# Patient Record
Sex: FEMALE | Race: OTHER | URBAN - METROPOLITAN AREA
[De-identification: names, ages, dates, MRNs, and addresses within clinical notes are randomized per-mention and may not be internally consistent; named-entity substitution may affect disease eponyms.]

---
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
## 2018-01-30 ENCOUNTER — APPOINTMENT (OUTPATIENT)
Dept: GENERAL RADIOLOGY | Facility: HOSPITAL | Age: 18
End: 2018-01-30
Attending: PHYSICIAN ASSISTANT

## 2018-01-30 ENCOUNTER — HOSPITAL ENCOUNTER (EMERGENCY)
Facility: HOSPITAL | Age: 18
Discharge: HOME | End: 2018-01-30

## 2018-01-30 VITALS
WEIGHT: 156 LBS | TEMPERATURE: 98 F | OXYGEN SATURATION: 100 % | SYSTOLIC BLOOD PRESSURE: 101 MMHG | DIASTOLIC BLOOD PRESSURE: 61 MMHG | HEIGHT: 63 IN | HEART RATE: 62 BPM | BODY MASS INDEX: 27.64 KG/M2 | RESPIRATION RATE: 16 BRPM

## 2018-01-30 DIAGNOSIS — R10.9 ABDOMINAL PAIN, ACUTE: Primary | ICD-10-CM

## 2018-01-30 LAB
ALBUMIN SERPL BCP-MCNC: 4.6 G/DL (ref 3.5–4.8)
ALP SERPL-CCNC: 53 U/L (ref 39–325)
ALT SERPL-CCNC: 17 U/L (ref 14–54)
ANION GAP SERPL CALC-SCNC: 9 MMOL/L (ref 0–18)
AST SERPL-CCNC: 18 U/L (ref 15–41)
B-HCG UR QL: NEGATIVE
BASOPHILS # BLD: 0 K/UL (ref 0–0.2)
BASOPHILS NFR BLD: 0 %
BILIRUB DIRECT SERPL-MCNC: 0.1 MG/DL (ref 0–0.2)
BILIRUB SERPL-MCNC: 0.7 MG/DL (ref 0.3–1.2)
BILIRUB UR QL: NEGATIVE
BUN SERPL-MCNC: 12 MG/DL (ref 8–20)
BUN/CREAT SERPL: 17.1 (ref 10–20)
CALCIUM SERPL-MCNC: 9.5 MG/DL (ref 8.5–10.5)
CHLORIDE SERPL-SCNC: 106 MMOL/L (ref 95–110)
CLARITY UR: CLEAR
CO2 SERPL-SCNC: 24 MMOL/L (ref 22–32)
COLOR UR: YELLOW
CREAT SERPL-MCNC: 0.7 MG/DL (ref 0.5–1.5)
EOSINOPHIL # BLD: 0.2 K/UL (ref 0–0.7)
EOSINOPHIL NFR BLD: 2 %
ERYTHROCYTE [DISTWIDTH] IN BLOOD BY AUTOMATED COUNT: 12.8 % (ref 11–15)
GLUCOSE SERPL-MCNC: 80 MG/DL (ref 70–99)
GLUCOSE UR-MCNC: NEGATIVE MG/DL
HCT VFR BLD AUTO: 44.8 % (ref 35–48)
HGB BLD-MCNC: 15.2 G/DL (ref 12–16)
HGB UR QL STRIP.AUTO: NEGATIVE
KETONES UR-MCNC: NEGATIVE MG/DL
LYMPHOCYTES # BLD: 2.2 K/UL (ref 1–4)
LYMPHOCYTES NFR BLD: 30 %
MCH RBC QN AUTO: 31.2 PG (ref 27–32)
MCHC RBC AUTO-ENTMCNC: 34 G/DL (ref 32–37)
MCV RBC AUTO: 91.7 FL (ref 80–100)
MONOCYTES # BLD: 0.3 K/UL (ref 0–1)
MONOCYTES NFR BLD: 4 %
NEUTROPHILS # BLD AUTO: 4.6 K/UL (ref 1.8–7.7)
NEUTROPHILS NFR BLD: 64 %
NITRITE UR QL STRIP.AUTO: NEGATIVE
OSMOLALITY UR CALC.SUM OF ELEC: 287 MOSM/KG (ref 275–295)
PH UR: 6 [PH] (ref 5–8)
PLATELET # BLD AUTO: 216 K/UL (ref 140–400)
PMV BLD AUTO: 8 FL (ref 7.4–10.3)
POTASSIUM SERPL-SCNC: 4 MMOL/L (ref 3.3–5.1)
PROT SERPL-MCNC: 6.7 G/DL (ref 5.9–8.4)
PROT UR-MCNC: NEGATIVE MG/DL
RBC # BLD AUTO: 4.89 M/UL (ref 3.7–5.4)
RBC #/AREA URNS AUTO: 1 /HPF
SODIUM SERPL-SCNC: 139 MMOL/L (ref 136–144)
SP GR UR STRIP: 1.02 (ref 1–1.03)
UROBILINOGEN UR STRIP-ACNC: <2
VIT C UR-MCNC: NEGATIVE MG/DL
WBC # BLD AUTO: 7.3 K/UL (ref 4–11)
WBC #/AREA URNS AUTO: 1 /HPF

## 2018-01-30 PROCEDURE — 36415 COLL VENOUS BLD VENIPUNCTURE: CPT

## 2018-01-30 PROCEDURE — 81025 URINE PREGNANCY TEST: CPT

## 2018-01-30 PROCEDURE — 81001 URINALYSIS AUTO W/SCOPE: CPT

## 2018-01-30 PROCEDURE — 99283 EMERGENCY DEPT VISIT LOW MDM: CPT

## 2018-01-30 PROCEDURE — 85025 COMPLETE CBC W/AUTO DIFF WBC: CPT | Performed by: PHYSICIAN ASSISTANT

## 2018-01-30 PROCEDURE — 80048 BASIC METABOLIC PNL TOTAL CA: CPT | Performed by: PHYSICIAN ASSISTANT

## 2018-01-30 PROCEDURE — 80076 HEPATIC FUNCTION PANEL: CPT | Performed by: PHYSICIAN ASSISTANT

## 2018-01-30 PROCEDURE — 74018 RADEX ABDOMEN 1 VIEW: CPT | Performed by: PHYSICIAN ASSISTANT

## 2018-01-30 RX ORDER — DICYCLOMINE HCL 20 MG
20 TABLET ORAL ONCE
Status: COMPLETED | OUTPATIENT
Start: 2018-01-30 | End: 2018-01-30

## 2018-01-30 RX ORDER — DICYCLOMINE HCL 20 MG
20 TABLET ORAL
Qty: 16 TABLET | Refills: 0 | Status: SHIPPED | OUTPATIENT
Start: 2018-01-30 | End: 2018-02-03

## 2018-01-30 NOTE — ED PROVIDER NOTES
Patient Seen in: [facility] AND [facility] Emergency Department    History   Patient presents with:  Abdomen/Flank Pain (GI/)    Stated Complaint: Abdominal & Back Pain    HPI  21 yof with onset of lower abd cramping this morning around 2 am. She denies chest Mouth/Throat: No oropharyngeal exudate, posterior oropharyngeal edema or posterior oropharyngeal erythema. Eyes: EOM are normal. Pupils are equal, round, and reactive to light. Neck: Normal range of motion. Neck supple.    Cardiovascular: Normal rate Creatinine 0.70 0.50 - 1.50 mg/dL   Calcium, Total 9.5 8.5 - 10.5 mg/dL   BUN/CREA Ratio 17.1 10.0 - 20.0   Anion Gap 9 0 - 18 mmol/L   Calculated Osmolality 287 275 - 295 mOsm/kg   GFR, Non-African American >60 >=60   GFR, -American >60 >=60   -H a visit in 2 days          Medications Prescribed:  Current Discharge Medication List    START taking these medications    Dicyclomine HCl 20 MG Oral Tab  Take 1 tablet (20 mg total) by mouth 4 (four) times daily before meals and nightly.   Qty: 16 tablet R

## 2018-01-30 NOTE — ED NOTES
Pt c/o lower abd pain which radiates to lower back. Also reports urinary urgency and pressure sensation during urination.

## 2018-08-07 ENCOUNTER — HOSPITAL (OUTPATIENT)
Dept: OTHER | Age: 18
End: 2018-08-07
Attending: EMERGENCY MEDICINE

## 2018-08-07 LAB
ALBUMIN SERPL-MCNC: 4.2 GM/DL (ref 3.6–5.1)
ALBUMIN/GLOB SERPL: 1.6 {RATIO} (ref 1–2.4)
ALP SERPL-CCNC: 44 UNIT/L (ref 45–117)
ALT SERPL-CCNC: 21 UNIT/L
AMORPH SED URNS QL MICRO: ABNORMAL
ANALYZER ANC (IANC): NORMAL
ANION GAP SERPL CALC-SCNC: 13 MMOL/L (ref 10–20)
APPEARANCE UR: CLEAR
AST SERPL-CCNC: 15 UNIT/L
BACTERIA #/AREA URNS HPF: ABNORMAL /HPF
BASOPHILS # BLD: 0 THOUSAND/MCL (ref 0–0.3)
BASOPHILS NFR BLD: 1 %
BILIRUB SERPL-MCNC: 0.5 MG/DL (ref 0.2–1)
BILIRUB UR QL: NEGATIVE
BUN SERPL-MCNC: 8 MG/DL (ref 6–20)
BUN/CREAT SERPL: 12 (ref 7–25)
CALCIUM SERPL-MCNC: 9.2 MG/DL (ref 8.4–10.2)
CAOX CRY URNS QL MICRO: ABNORMAL
CHLORIDE: 106 MMOL/L (ref 98–107)
CO2 SERPL-SCNC: 25 MMOL/L (ref 21–32)
COLOR UR: COLORLESS
CREAT SERPL-MCNC: 0.65 MG/DL (ref 0.51–0.95)
DIFFERENTIAL METHOD BLD: NORMAL
EOSINOPHIL # BLD: 0.3 THOUSAND/MCL (ref 0.1–0.5)
EOSINOPHIL NFR BLD: 4 %
EPITH CASTS #/AREA URNS LPF: ABNORMAL /[LPF]
ERYTHROCYTE [DISTWIDTH] IN BLOOD: 12.6 % (ref 11–15)
FATTY CASTS #/AREA URNS LPF: ABNORMAL /[LPF]
GLOBULIN SER-MCNC: 2.7 GM/DL (ref 2–4)
GLUCOSE SERPL-MCNC: 87 MG/DL (ref 65–99)
GLUCOSE UR-MCNC: NEGATIVE MG/DL
GRAN CASTS #/AREA URNS LPF: ABNORMAL /[LPF]
HEMATOCRIT: 40.7 % (ref 36–46.5)
HGB BLD-MCNC: 14.4 GM/DL (ref 12–15.5)
HGB UR QL: ABNORMAL
HYALINE CASTS #/AREA URNS LPF: ABNORMAL /LPF (ref 0–5)
KETONES UR-MCNC: NEGATIVE MG/DL
LEUKOCYTE ESTERASE UR QL STRIP: NEGATIVE
LIPASE SERPL-CCNC: 174 UNIT/L (ref 73–393)
LYMPHOCYTES # BLD: 2.9 THOUSAND/MCL (ref 1.2–5.2)
LYMPHOCYTES NFR BLD: 34 %
MAGNESIUM SERPL-MCNC: 2 MG/DL (ref 1.7–2.4)
MCH RBC QN AUTO: 31.3 PG (ref 26–34)
MCHC RBC AUTO-ENTMCNC: 35.4 GM/DL (ref 32–36.5)
MCV RBC AUTO: 88.5 FL (ref 78–100)
MIXED CELL CASTS #/AREA URNS LPF: ABNORMAL /[LPF]
MONOCYTES # BLD: 0.5 THOUSAND/MCL (ref 0.3–0.9)
MONOCYTES NFR BLD: 5 %
MUCOUS THREADS URNS QL MICRO: ABNORMAL
NEUTROPHILS # BLD: 4.9 THOUSAND/MCL (ref 1.8–8)
NEUTROPHILS NFR BLD: 56 %
NEUTS SEG NFR BLD: NORMAL %
NITRITE UR QL: NEGATIVE
NRBC (NRBCRE): NORMAL
PH UR: 7 UNIT (ref 5–7)
PLATELET # BLD: 215 THOUSAND/MCL (ref 140–450)
POTASSIUM SERPL-SCNC: 3.9 MMOL/L (ref 3.4–5.1)
PROT SERPL-MCNC: 6.9 GM/DL (ref 6.4–8.2)
PROT UR QL: NEGATIVE MG/DL
RBC # BLD: 4.6 MILLION/MCL (ref 4–5.2)
RBC #/AREA URNS HPF: ABNORMAL /HPF (ref 0–3)
RBC CASTS #/AREA URNS LPF: ABNORMAL /[LPF]
RENAL EPI CELLS #/AREA URNS HPF: ABNORMAL /[HPF]
SODIUM SERPL-SCNC: 140 MMOL/L (ref 135–145)
SP GR UR: <1.005 (ref 1–1.03)
SPECIMEN SOURCE: ABNORMAL
SPERM URNS QL MICRO: ABNORMAL
SQUAMOUS #/AREA URNS HPF: ABNORMAL /HPF (ref 0–5)
T VAGINALIS URNS QL MICRO: ABNORMAL
TRI-PHOS CRY URNS QL MICRO: ABNORMAL
URATE CRY URNS QL MICRO: ABNORMAL
URINE REFLEX: ABNORMAL
URNS CMNT MICRO: ABNORMAL
UROBILINOGEN UR QL: 0.2 MG/DL (ref 0–1)
WAXY CASTS #/AREA URNS LPF: ABNORMAL /[LPF]
WBC # BLD: 8.6 THOUSAND/MCL (ref 4.2–11)
WBC #/AREA URNS HPF: ABNORMAL /HPF (ref 0–5)
WBC CASTS #/AREA URNS LPF: ABNORMAL /[LPF]
YEAST HYPHAE URNS QL MICRO: ABNORMAL
YEAST URNS QL MICRO: ABNORMAL

## 2018-12-11 ENCOUNTER — HOSPITAL ENCOUNTER (OUTPATIENT)
Age: 18
Discharge: HOME | End: 2018-12-11
Payer: COMMERCIAL

## 2018-12-11 VITALS
HEART RATE: 98 BPM | TEMPERATURE: 99 F | BODY MASS INDEX: 27.29 KG/M2 | HEIGHT: 63 IN | DIASTOLIC BLOOD PRESSURE: 75 MMHG | WEIGHT: 154 LBS | SYSTOLIC BLOOD PRESSURE: 116 MMHG | RESPIRATION RATE: 18 BRPM | OXYGEN SATURATION: 99 %

## 2018-12-11 DIAGNOSIS — N89.8 VAGINAL DISCHARGE: Primary | ICD-10-CM

## 2018-12-11 PROCEDURE — 87205 SMEAR GRAM STAIN: CPT | Performed by: NURSE PRACTITIONER

## 2018-12-11 PROCEDURE — 87491 CHLMYD TRACH DNA AMP PROBE: CPT | Performed by: NURSE PRACTITIONER

## 2018-12-11 PROCEDURE — 87106 FUNGI IDENTIFICATION YEAST: CPT | Performed by: NURSE PRACTITIONER

## 2018-12-11 PROCEDURE — 81003 URINALYSIS AUTO W/O SCOPE: CPT

## 2018-12-11 PROCEDURE — 87591 N.GONORRHOEAE DNA AMP PROB: CPT | Performed by: NURSE PRACTITIONER

## 2018-12-11 PROCEDURE — 99214 OFFICE O/P EST MOD 30 MIN: CPT

## 2018-12-11 PROCEDURE — 99204 OFFICE O/P NEW MOD 45 MIN: CPT

## 2018-12-11 PROCEDURE — 87808 TRICHOMONAS ASSAY W/OPTIC: CPT | Performed by: NURSE PRACTITIONER

## 2018-12-11 RX ORDER — METRONIDAZOLE 500 MG/1
500 TABLET ORAL 2 TIMES DAILY
Qty: 14 TABLET | Refills: 0 | Status: SHIPPED | OUTPATIENT
Start: 2018-12-11 | End: 2018-12-18

## 2018-12-11 NOTE — ED INITIAL ASSESSMENT (HPI)
Pt complaining of vaginal discharge, odor, itching for one week. Pt denies any pain with urination  Pt states her period started today.

## 2018-12-11 NOTE — ED PROVIDER NOTES
Patient presents with:  Eval-G (gynecologic)      HPI:     Gagan Rachel is a 19year old female with a history of bipolar and PTSD present presents with vaginal discharge, odor and vaginal itching over the course the last week.   Denies any dysuria, urinary with the patient patient would like to be treated for BV at this time. Will place patient on Flagyl, she has tolerated this medication in the past.  Discussed with her that we will call her in 2 days with results.   Any worsening symptoms she should go to

## 2019-01-28 ENCOUNTER — OFFICE VISIT (OUTPATIENT)
Dept: INTERNAL MEDICINE CLINIC | Facility: CLINIC | Age: 19
End: 2019-01-28
Payer: COMMERCIAL

## 2019-01-28 VITALS
WEIGHT: 148.81 LBS | OXYGEN SATURATION: 98 % | DIASTOLIC BLOOD PRESSURE: 70 MMHG | HEART RATE: 92 BPM | SYSTOLIC BLOOD PRESSURE: 116 MMHG | HEIGHT: 62 IN | BODY MASS INDEX: 27.38 KG/M2

## 2019-01-28 DIAGNOSIS — K59.00 CONSTIPATION, UNSPECIFIED CONSTIPATION TYPE: Primary | ICD-10-CM

## 2019-01-28 DIAGNOSIS — R19.4 CHANGE IN STOOL HABITS: ICD-10-CM

## 2019-01-28 DIAGNOSIS — K21.9 GASTROESOPHAGEAL REFLUX DISEASE, ESOPHAGITIS PRESENCE NOT SPECIFIED: ICD-10-CM

## 2019-01-28 DIAGNOSIS — R10.9 ABDOMINAL PAIN, UNSPECIFIED ABDOMINAL LOCATION: ICD-10-CM

## 2019-01-28 DIAGNOSIS — F31.9 BIPOLAR AFFECTIVE DISORDER, REMISSION STATUS UNSPECIFIED (HCC): ICD-10-CM

## 2019-01-28 PROBLEM — Z72.0 TOBACCO USE: Status: ACTIVE | Noted: 2019-01-28

## 2019-01-28 LAB
ALBUMIN SERPL BCP-MCNC: 4.4 G/DL (ref 3.5–4.8)
ALBUMIN/GLOB SERPL: 2.4 {RATIO} (ref 1–2)
ALP SERPL-CCNC: 40 U/L (ref 39–325)
ALT SERPL-CCNC: 17 U/L (ref 14–54)
ANION GAP SERPL CALC-SCNC: 8 MMOL/L (ref 0–18)
AST SERPL-CCNC: 21 U/L (ref 15–41)
BASOPHILS # BLD: 0 K/UL (ref 0–0.2)
BASOPHILS NFR BLD: 0 %
BILIRUB SERPL-MCNC: 0.6 MG/DL (ref 0.3–1.2)
BUN SERPL-MCNC: 9 MG/DL (ref 8–20)
BUN/CREAT SERPL: 12 (ref 10–20)
CALCIUM SERPL-MCNC: 9.2 MG/DL (ref 8.5–10.5)
CHLORIDE SERPL-SCNC: 107 MMOL/L (ref 95–110)
CO2 SERPL-SCNC: 20 MMOL/L (ref 22–32)
CREAT SERPL-MCNC: 0.75 MG/DL (ref 0.5–1.5)
EOSINOPHIL # BLD: 0.2 K/UL (ref 0–0.7)
EOSINOPHIL NFR BLD: 4 %
ERYTHROCYTE [DISTWIDTH] IN BLOOD BY AUTOMATED COUNT: 12.9 % (ref 11–15)
GLOBULIN PLAS-MCNC: 1.8 G/DL (ref 2.5–3.7)
GLUCOSE SERPL-MCNC: 90 MG/DL (ref 70–99)
HCT VFR BLD AUTO: 42.4 % (ref 35–48)
HGB BLD-MCNC: 14.1 G/DL (ref 12–16)
LYMPHOCYTES # BLD: 1.9 K/UL (ref 1–4)
LYMPHOCYTES NFR BLD: 32 %
MCH RBC QN AUTO: 30.2 PG (ref 27–32)
MCHC RBC AUTO-ENTMCNC: 33.2 G/DL (ref 32–37)
MCV RBC AUTO: 90.8 FL (ref 80–100)
MONOCYTES # BLD: 0.3 K/UL (ref 0–1)
MONOCYTES NFR BLD: 6 %
NEUTROPHILS # BLD AUTO: 3.6 K/UL (ref 1.8–7.7)
NEUTROPHILS NFR BLD: 59 %
OSMOLALITY UR CALC.SUM OF ELEC: 278 MOSM/KG (ref 275–295)
PATIENT FASTING: NO
PLATELET # BLD AUTO: 177 K/UL (ref 140–400)
PMV BLD AUTO: 8.5 FL (ref 7.4–10.3)
POTASSIUM SERPL-SCNC: 4.1 MMOL/L (ref 3.3–5.1)
PROT SERPL-MCNC: 6.2 G/DL (ref 5.9–8.4)
RBC # BLD AUTO: 4.67 M/UL (ref 3.7–5.4)
SODIUM SERPL-SCNC: 135 MMOL/L (ref 136–144)
TSH SERPL-ACNC: 0.7 UIU/ML (ref 0.45–5.33)
WBC # BLD AUTO: 6.1 K/UL (ref 4–11)

## 2019-01-28 PROCEDURE — 80050 GENERAL HEALTH PANEL: CPT | Performed by: FAMILY MEDICINE

## 2019-01-28 PROCEDURE — 99214 OFFICE O/P EST MOD 30 MIN: CPT | Performed by: FAMILY MEDICINE

## 2019-01-28 RX ORDER — POLYETHYLENE GLYCOL 3350 17 G/17G
17 POWDER, FOR SOLUTION ORAL DAILY
Qty: 30 EACH | Refills: 2 | Status: SHIPPED | OUTPATIENT
Start: 2019-01-28 | End: 2019-03-25

## 2019-01-28 RX ORDER — OMEPRAZOLE 20 MG/1
20 TABLET, DELAYED RELEASE ORAL
Qty: 30 TABLET | Refills: 1 | Status: SHIPPED | OUTPATIENT
Start: 2019-01-28 | End: 2019-10-01

## 2019-01-28 NOTE — PATIENT INSTRUCTIONS
Metamucil daily    Docusate daily    miralax ( polyethylene glycol) at night if needed for constipation    Lots of water    Work on stress    Follow up 2 mos for physical    Stop smoking      Diet and Lifestyle Tips for Irritable Bowel Syndrome (IBS)    Livia Brown foods, such as citrus and tomato-based foods. These can increase symptoms. · Limit drinking alcohol, caffeine, and fizzy beverages. All increase acid reflux.   · Try limiting chocolate, peppermint, and spearmint. These can worsen acid reflux in some people

## 2019-01-28 NOTE — PROGRESS NOTES
Jeronimo Fabry is a 19year old female. CC:  Patient presents with: Other: Patient complains of stomach problems. Having constipation and diarrhea.       HPI:      On and off constipated x 1 year  Problems is mostly constipation  Does drink a lot of jacek shortness of breath  GI:  No N/V/D, no pain  PSYCH:  No depression, no anxiety. SKIN:  No rashes, no lesions  EXTREMITIES:  No swelling, full ROM. :  No urinary or genital complaints.   MKSKL:  No ROM restrictions, no weakness, no pain  NEURO:  Denies h related to constipation  See #1  - TSH W REFLEX TO FREE T4; Future  - COMP METABOLIC PANEL (14); Future  - CBC WITH DIFFERENTIAL WITH PLATELET;  Future  - TSH W REFLEX TO FREE T4  - COMP METABOLIC PANEL (14)  - CBC WITH DIFFERENTIAL WITH PLATELET  - CBC W/

## 2019-02-01 ENCOUNTER — TELEPHONE (OUTPATIENT)
Dept: INTERNAL MEDICINE CLINIC | Facility: CLINIC | Age: 19
End: 2019-02-01

## 2019-03-25 ENCOUNTER — OFFICE VISIT (OUTPATIENT)
Dept: INTERNAL MEDICINE CLINIC | Facility: CLINIC | Age: 19
End: 2019-03-25
Payer: COMMERCIAL

## 2019-03-25 VITALS
SYSTOLIC BLOOD PRESSURE: 110 MMHG | HEART RATE: 84 BPM | HEIGHT: 62 IN | DIASTOLIC BLOOD PRESSURE: 68 MMHG | WEIGHT: 153.81 LBS | BODY MASS INDEX: 28.31 KG/M2 | OXYGEN SATURATION: 99 %

## 2019-03-25 DIAGNOSIS — F60.3 BORDERLINE PERSONALITY DISORDER (HCC): ICD-10-CM

## 2019-03-25 DIAGNOSIS — D49.2 SKIN GROWTH: ICD-10-CM

## 2019-03-25 DIAGNOSIS — Z00.00 PHYSICAL EXAM: Primary | ICD-10-CM

## 2019-03-25 DIAGNOSIS — Z72.0 TOBACCO USE: ICD-10-CM

## 2019-03-25 PROCEDURE — 90472 IMMUNIZATION ADMIN EACH ADD: CPT | Performed by: FAMILY MEDICINE

## 2019-03-25 PROCEDURE — 90734 MENACWYD/MENACWYCRM VACC IM: CPT | Performed by: FAMILY MEDICINE

## 2019-03-25 PROCEDURE — 99395 PREV VISIT EST AGE 18-39: CPT | Performed by: FAMILY MEDICINE

## 2019-03-25 PROCEDURE — 90651 9VHPV VACCINE 2/3 DOSE IM: CPT | Performed by: FAMILY MEDICINE

## 2019-03-25 PROCEDURE — 90471 IMMUNIZATION ADMIN: CPT | Performed by: FAMILY MEDICINE

## 2019-03-25 NOTE — PROGRESS NOTES
HPI:   Melissa Juarez is a 19year old female who presents for a complete physical exam.    Menses:  Regular  Around q 30 days, last 4 days    Contraception:  Condoms only, too many mood SE on pills   Supplements:  Probiotics   Exercise:  Going to start at problems  LUNGS: denies shortness of breath  CARDIOVASCULAR: denies chest pain  GI: denies abdominal pain,  No constipation or diarrhea  : denies dysuria, vaginal discharge or itching  NEURO: denies headaches  PSYCHE: denies depression or anxiety    EXAM visit.      4. Skin growth    - DERM - INTERNAL      Meds & Refills for this Visit:  Requested Prescriptions      No prescriptions requested or ordered in this encounter       Imaging & Consults:  None

## 2019-04-24 ENCOUNTER — OFFICE VISIT (OUTPATIENT)
Dept: INTERNAL MEDICINE CLINIC | Facility: CLINIC | Age: 19
End: 2019-04-24
Payer: COMMERCIAL

## 2019-04-24 VITALS
BODY MASS INDEX: 27.79 KG/M2 | HEIGHT: 62 IN | OXYGEN SATURATION: 99 % | WEIGHT: 151 LBS | HEART RATE: 84 BPM | SYSTOLIC BLOOD PRESSURE: 116 MMHG | DIASTOLIC BLOOD PRESSURE: 70 MMHG

## 2019-04-24 DIAGNOSIS — Z72.0 TOBACCO USE: ICD-10-CM

## 2019-04-24 DIAGNOSIS — K59.00 CONSTIPATION, UNSPECIFIED CONSTIPATION TYPE: Primary | ICD-10-CM

## 2019-04-24 DIAGNOSIS — F60.3 BORDERLINE PERSONALITY DISORDER (HCC): ICD-10-CM

## 2019-04-24 DIAGNOSIS — K21.9 GASTROESOPHAGEAL REFLUX DISEASE, ESOPHAGITIS PRESENCE NOT SPECIFIED: ICD-10-CM

## 2019-04-24 PROCEDURE — 99214 OFFICE O/P EST MOD 30 MIN: CPT | Performed by: FAMILY MEDICINE

## 2019-04-24 RX ORDER — OMEPRAZOLE 20 MG/1
20 TABLET, DELAYED RELEASE ORAL
Qty: 30 TABLET | Refills: 1 | Status: SHIPPED | OUTPATIENT
Start: 2019-04-24 | End: 2020-03-12 | Stop reason: ALTCHOICE

## 2019-04-24 NOTE — PROGRESS NOTES
CC:  Abdominal Pain (patient presents to clinic for IBS F/U) and HPV (2nd dose HPV)      Hx of CC:    F/u IBS-     Constipated better, not needing miralax much   More nauseated lately  Takes PPI but prn  Not taking it past few days  Bad acid for 3 days las movement   NEURO: no gross deficits   Assessment and Plan:      1. Constipation, unspecified constipation type  Improved     2. Gastroesophageal reflux disease, esophagitis presence not specified  Discussed dietary and lifestyle modification.  Avoid trigger

## 2019-10-01 ENCOUNTER — OFFICE VISIT (OUTPATIENT)
Dept: FAMILY MEDICINE CLINIC | Facility: CLINIC | Age: 19
End: 2019-10-01
Payer: COMMERCIAL

## 2019-10-01 VITALS
RESPIRATION RATE: 13 BRPM | BODY MASS INDEX: 27.42 KG/M2 | DIASTOLIC BLOOD PRESSURE: 80 MMHG | OXYGEN SATURATION: 98 % | SYSTOLIC BLOOD PRESSURE: 128 MMHG | WEIGHT: 149 LBS | HEIGHT: 62 IN | HEART RATE: 84 BPM

## 2019-10-01 DIAGNOSIS — Z72.0 TOBACCO USE: ICD-10-CM

## 2019-10-01 DIAGNOSIS — Z23 NEED FOR VACCINATION: ICD-10-CM

## 2019-10-01 DIAGNOSIS — Z00.00 HEALTHCARE MAINTENANCE: Primary | ICD-10-CM

## 2019-10-01 DIAGNOSIS — Z11.3 SCREEN FOR STD (SEXUALLY TRANSMITTED DISEASE): ICD-10-CM

## 2019-10-01 DIAGNOSIS — R59.0 LEFT CERVICAL LYMPHADENOPATHY: ICD-10-CM

## 2019-10-01 PROCEDURE — 90686 IIV4 VACC NO PRSV 0.5 ML IM: CPT | Performed by: FAMILY MEDICINE

## 2019-10-01 PROCEDURE — 81025 URINE PREGNANCY TEST: CPT | Performed by: FAMILY MEDICINE

## 2019-10-01 PROCEDURE — 87591 N.GONORRHOEAE DNA AMP PROB: CPT | Performed by: FAMILY MEDICINE

## 2019-10-01 PROCEDURE — 87389 HIV-1 AG W/HIV-1&-2 AB AG IA: CPT | Performed by: FAMILY MEDICINE

## 2019-10-01 PROCEDURE — 80061 LIPID PANEL: CPT | Performed by: FAMILY MEDICINE

## 2019-10-01 PROCEDURE — 86780 TREPONEMA PALLIDUM: CPT | Performed by: FAMILY MEDICINE

## 2019-10-01 PROCEDURE — 90472 IMMUNIZATION ADMIN EACH ADD: CPT | Performed by: FAMILY MEDICINE

## 2019-10-01 PROCEDURE — 99213 OFFICE O/P EST LOW 20 MIN: CPT | Performed by: FAMILY MEDICINE

## 2019-10-01 PROCEDURE — 87491 CHLMYD TRACH DNA AMP PROBE: CPT | Performed by: FAMILY MEDICINE

## 2019-10-01 PROCEDURE — 80050 GENERAL HEALTH PANEL: CPT | Performed by: FAMILY MEDICINE

## 2019-10-01 PROCEDURE — 90732 PPSV23 VACC 2 YRS+ SUBQ/IM: CPT | Performed by: FAMILY MEDICINE

## 2019-10-01 PROCEDURE — 36415 COLL VENOUS BLD VENIPUNCTURE: CPT | Performed by: FAMILY MEDICINE

## 2019-10-01 PROCEDURE — 90471 IMMUNIZATION ADMIN: CPT | Performed by: FAMILY MEDICINE

## 2019-10-01 NOTE — PROGRESS NOTES
FLULAVAL 0.5ML ADM TO RD IM , [id number] 0.5ML ADM TO LD IM, VIS given to patient, patient tolerated vaccines well

## 2019-10-01 NOTE — PROGRESS NOTES
HPI:   Patient presents with:  Physical: annual physical      Yary Sousa is a 22year old female presenting for:  Already had physical on 3/2019. Requesting routine bloodwork and STD testing    Concerned about enlarged lymph nodes in neck b/l.  Had re LDL Cholesterol 79 <100 mg/dL    VLDL 21 0 - 30 mg/dL    Non HDL Chol 100 <130 mg/dL    FASTING Yes    TSH W REFLEX TO FREE T4   Result Value Ref Range    TSH 1.480 0.358 - 3.740 mIU/mL   T PALLIDUM SCREENING CASCADE   Result Value Ref Range    Treponemal 10/21/2012      Smokeless tobacco: Never Used    Alcohol use: No      Alcohol/week: 0.0 standard drinks    Drug use: No     Family History:  No family history on file.        REVIEW OF SYSTEMS:   Review of Systems   Constitutional: Negative for fatigue and COMP METABOLIC PANEL (14); Future  -     LIPID PANEL; Future  -     TSH W REFLEX TO FREE T4; Future  -     URINE PREGNANCY TEST    Screen for STD (sexually transmitted disease)  -     HIV AG AB COMBO; Future  -     CHLAMYDIA/GONOCOCCUS, VIRIDIANA;  Future  - 07/08/2019  Influenza Vaccine(1) due on 09/01/2019  Annual Depression Screen due on 03/25/2020  Annual Physical due on 03/25/2020  Tobacco Cessation Counseling 2 Years due on 10/01/2021  DTaP,Tdap,and Td Vaccines(7 - Td) due on 08/21/2022  Hepatitis B Vacc

## 2019-10-09 ENCOUNTER — TELEPHONE (OUTPATIENT)
Dept: FAMILY MEDICINE CLINIC | Facility: CLINIC | Age: 19
End: 2019-10-09

## 2019-10-09 NOTE — TELEPHONE ENCOUNTER
----- Message from Aquiles Núñez MD sent at 10/8/2019 12:49 AM CDT -----  Please let her know all bloodwork and STD testing normal

## 2020-03-12 ENCOUNTER — OFFICE VISIT (OUTPATIENT)
Dept: FAMILY MEDICINE CLINIC | Facility: CLINIC | Age: 20
End: 2020-03-12
Payer: COMMERCIAL

## 2020-03-12 VITALS
OXYGEN SATURATION: 98 % | HEART RATE: 83 BPM | SYSTOLIC BLOOD PRESSURE: 98 MMHG | HEIGHT: 63.5 IN | BODY MASS INDEX: 26.08 KG/M2 | WEIGHT: 149 LBS | DIASTOLIC BLOOD PRESSURE: 70 MMHG

## 2020-03-12 DIAGNOSIS — R59.0 LYMPHADENOPATHY, CERVICAL: Primary | ICD-10-CM

## 2020-03-12 DIAGNOSIS — R53.83 FATIGUE, UNSPECIFIED TYPE: ICD-10-CM

## 2020-03-12 LAB
BASOPHILS # BLD AUTO: 0.05 X10(3) UL (ref 0–0.2)
BASOPHILS NFR BLD AUTO: 0.7 %
DEPRECATED RDW RBC AUTO: 42.4 FL (ref 35.1–46.3)
EOSINOPHIL # BLD AUTO: 0.3 X10(3) UL (ref 0–0.7)
EOSINOPHIL NFR BLD AUTO: 4 %
ERYTHROCYTE [DISTWIDTH] IN BLOOD BY AUTOMATED COUNT: 12.4 % (ref 11–15)
HCT VFR BLD AUTO: 41.3 % (ref 35–48)
HGB BLD-MCNC: 14 G/DL (ref 12–16)
IMM GRANULOCYTES # BLD AUTO: 0.02 X10(3) UL (ref 0–1)
IMM GRANULOCYTES NFR BLD: 0.3 %
LYMPHOCYTES # BLD AUTO: 2.57 X10(3) UL (ref 1–4)
LYMPHOCYTES NFR BLD AUTO: 34.3 %
MCH RBC QN AUTO: 31.3 PG (ref 26–34)
MCHC RBC AUTO-ENTMCNC: 33.9 G/DL (ref 31–37)
MCV RBC AUTO: 92.4 FL (ref 80–100)
MONOCYTES # BLD AUTO: 0.32 X10(3) UL (ref 0.1–1)
MONOCYTES NFR BLD AUTO: 4.3 %
NEUTROPHILS # BLD AUTO: 4.23 X10 (3) UL (ref 1.5–7.7)
NEUTROPHILS # BLD AUTO: 4.23 X10(3) UL (ref 1.5–7.7)
NEUTROPHILS NFR BLD AUTO: 56.4 %
PLATELET # BLD AUTO: 200 10(3)UL (ref 150–450)
RBC # BLD AUTO: 4.47 X10(6)UL (ref 3.8–5.3)
T3FREE SERPL-MCNC: 2.73 PG/ML (ref 2.4–4.2)
T4 FREE SERPL-MCNC: 1 NG/DL (ref 0.8–1.7)
TSI SER-ACNC: 0.32 MIU/ML (ref 0.36–3.74)
WBC # BLD AUTO: 7.5 X10(3) UL (ref 4–11)

## 2020-03-12 PROCEDURE — 99214 OFFICE O/P EST MOD 30 MIN: CPT | Performed by: FAMILY MEDICINE

## 2020-03-12 PROCEDURE — 85025 COMPLETE CBC W/AUTO DIFF WBC: CPT | Performed by: FAMILY MEDICINE

## 2020-03-12 PROCEDURE — 84481 FREE ASSAY (FT-3): CPT | Performed by: FAMILY MEDICINE

## 2020-03-12 PROCEDURE — 36415 COLL VENOUS BLD VENIPUNCTURE: CPT | Performed by: FAMILY MEDICINE

## 2020-03-12 PROCEDURE — 84443 ASSAY THYROID STIM HORMONE: CPT | Performed by: FAMILY MEDICINE

## 2020-03-12 PROCEDURE — 84439 ASSAY OF FREE THYROXINE: CPT | Performed by: FAMILY MEDICINE

## 2020-03-12 NOTE — PROGRESS NOTES
CHIEF COMPLAINT: Patient presents with:  Throat Problem: lymph nodule        HPI:     Melissa Juarez is a 22year old female presents for neck lump and fatigue. Carine Zafar is a 23 yo F p/w concern for swelling of her lymph nodes.   This has been occurring sin the the HPI. PHYSICAL EXAM:   BP 98/70 (BP Location: Right arm, Patient Position: Sitting, Cuff Size: adult)   Pulse 83   Ht 63.5\"   Wt 149 lb (67.6 kg)   LMP 02/27/2020 (Approximate)   SpO2 98%   BMI 25.98 kg/m²   Vital signs reviewed. Appears stated a • ALT 10/01/2019 24    • AST 10/01/2019 17    • Alkaline Phosphatase 10/01/2019 47*   • Bilirubin, Total 10/01/2019 0.6    • Total Protein 10/01/2019 6.9    • Albumin 10/01/2019 4.2    • Globulin  10/01/2019 2.7*   • A/G Ratio 10/01/2019 1.6    • FASTING Completed      Patient/Caregiver Education: There are no barriers to learning. Medical education done. Outcome: Patient verbalizes understanding and agrees with plan. Advised to call or RTC if symptoms persist or worsen.     Problem List:   Patient Active

## 2020-03-12 NOTE — PATIENT INSTRUCTIONS
Lymphadenopathy  Lymphadenopathy is swelling of the lymph nodes. Lymph nodes are small, bean-shaped glands around the body. What are lymph nodes? Lymph nodes are part of your immune system.  These glands are found in your neck, over your clavicle, armpi You may also have symptoms from an infection causing the swollen glands. These symptoms may include fever, sore throat, body aches, or cough. Diagnosing lymphadenopathy  Your healthcare provider will ask about your health history and symptoms.  He or she w © 3650-9377 [address]. [address] [facility], [facility] [place]. All rights reserved. This information is not intended as a substitute for professional medical care. Always follow your healthcare professional's instructions.

## 2020-04-22 ENCOUNTER — TELEPHONE (OUTPATIENT)
Dept: FAMILY MEDICINE CLINIC | Facility: CLINIC | Age: 20
End: 2020-04-22

## 2020-08-04 NOTE — BH LEVEL OF CARE ASSESSMENT
Level of Care Assessment Note    General Questions  Why are you here?: \"I said I was suicidal. I have borderline personality disorder, it was not serious. I said it out of impulse. \"  Precipitating Events: Patient was pulled over for not completely stoppi feel like she has been coasting lately. She doesn't have the same drive that she did. She used to think it could be better.  Her step-father, sister and I will be moving to another state in January and she has been living with a family friend in a trailer o attempts. Family History or Personal Lived Experience of Loss or Near Loss by Suicide: Yes  Describe loss(es): Friends Abhijit Hathaway ( when patient was 14), Fransisco ( 4 years ago) and Keith Brown ( 2 years ago).      Danger to Others/Property  Have you h been sleeping well and hasn't been eating lately. Anxiety Symptoms: Other (Comment); Generalized;Panic attack  Panic Attacks: Patient reports having anxiety and panic attack hx but said she can deal with it.  Patient did become overwhelmed earlier tonight Seclusion/Restraint: Yes    Alcohol Use  How often do you have a drink containing alcohol? : Currently abstinent but used to drink  Alcohol Use  Age at first use?: 21years old   Route: Oral  Average amount used? : A half a bottle (of 1/5th) and was consum Issues:  Other (comment)(Patient denies issues with her job)  Concerns/Conflicts with Social Relationships: No  Decreased Functional Ability: Other (comment)(Patient denies issues with completing ADL's)  Do you have any prior/current legal concerns?: Other Organized  Content: Ordinary  Level of Consciousness: Alert  Level of Consciousness: Alert  Behavior  Exhibited behavior: Participated    Assessment Summary  Assessment Summary: Patient is a 23 y.o. female who presents to the ED via [place] [employer] after she w Pre-[facility]emplative    Level of Care Recommendations  Consulted with: Dr. Perez Monday  Level of Care Recommendation: Inpatient Acute Care  Unit: Adult  Reason for Unit Assigned: Age, sxs  Inpatient Criteria: Suicidal/homicidal risk; Severely decreased function;F

## 2020-08-04 NOTE — ED NOTES
This writer explained [address], admission process and mental health rights. Patient agreed to voluntary and was provided copies for all documents signed. A copy of the paperwork was scanned into EMR and originals were placed on the chart.

## 2020-08-04 NOTE — ED NOTES
Transfer summary    Patient is refusing [facility] for placement and is requesting [address] [place] - accepted referral over the phone and packet was faxed for review.   Marino Cabral took basic information and asked for the packet to be

## 2020-08-04 NOTE — ED NOTES
Pt out of ed via [facility]. Pt in no distress speaking clear sentences.  Pt to [facility].

## 2020-08-04 NOTE — ED INITIAL ASSESSMENT (HPI)
Pt presents to ED in [employer] lockup after being arrested. Pt made SI statements in lockup. Pt has a mental health history with 9 hospitalizations. Pt uncooperative. PD at bedside. Belongings given to security.  Pt has healing cuts to bilateral thighs and wrist.

## 2020-08-04 NOTE — ED NOTES
Pt states the following \"I am suicidal because I am boarderline and my life sucks. I have $13 to my name. I have been clean for 5 days of drugs and alcohol. I take medical mariajuana. I need a cigarette!  I am only suicidal because I am borderline and don'

## 2020-08-04 NOTE — ED NOTES
Per Neda Min, [address] needs to make some arrangements for the bed and will follow up after 8 AM.

## 2020-08-04 NOTE — ED PROVIDER NOTES
Patient Seen in: [facility] AND [facility] Emergency Department      History   Patient presents with:  Gera-ALLEN    Stated Complaint: SI    HPI    24-year-old female with a history of reported bipolar disorder as well as PTSD and history of substance abuse and self kg/m²         Physical Exam    Constitutional: Oriented to person, place, and time. Appears well-developed. No distress but with pressured speech, anxious and tearful. Head: Normocephalic and atraumatic.    Eyes: Conjunctivae are normal. Pupils are equal DRAW GOLD   CBC W/ DIFFERENTIAL                  MDM     The patient will be kept on seclusion here. She will have medical clearance. She will be evaluated by the psychiatric liaison for further management decisions at that time.     Certificate completed

## 2020-08-04 NOTE — ED NOTES
Patient explained that she needed to lower her voice and stop yelling at staff. Patient upset that she is staying inpatient and states no one understands. Patient offered a phone to call mother and boyfriend as long as she stays calm.  Patient agrees to leda

## 2020-08-04 NOTE — ED PROVIDER NOTES
Patient endorsed to me by Dr. Shanna Morgan for continuation of care. Patient is awaiting inpatient psychiatric transfer and has been calm throughout my shift. Patient endorsed to Dr. Manuel No for continuation of care.

## 2020-12-20 NOTE — ED NOTES
Patient has been tentatively accepted to [facility] under Dr. Alex Sin pending RN report. Per Calista Rinne, she will be calling in about 20 minutes to get RN report. [facility] -- Sami Golden, Ngoc, Vlad, Otoniel Bliss Savella  Office # [phone]      Follow Up:  post op    Subjective/Observations:   No events overnight resting comfortably in bed.  No complaints of chest pain, dyspnea, or palpitations reported. No signs of orthopnea or PND.      REVIEW OF SYSTEMS: All other review of systems is negative unless indicated above    PAST MEDICAL & SURGICAL HISTORY:  Peripheral vascular disease  S/p Angioplasty rt lower EXT Nov 2020    Hyperlipidemia    Diabetes    HTN (hypertension)    H/O angioplasty  RLE        MEDICATIONS  (STANDING):  atorvastatin 40 milliGRAM(s) Oral at bedtime  dextrose 40% Gel 15 Gram(s) Oral once  dextrose 5%. 1000 milliLiter(s) (50 mL/Hr) IV Continuous <Continuous>  dextrose 5%. 1000 milliLiter(s) (100 mL/Hr) IV Continuous <Continuous>  dextrose 50% Injectable 25 Gram(s) IV Push once  dextrose 50% Injectable 25 Gram(s) IV Push once  dextrose 50% Injectable 12.5 Gram(s) IV Push once  enoxaparin Injectable 40 milliGRAM(s) SubCutaneous daily  ertapenem  IVPB 1000 milliGRAM(s) IV Intermittent every 24 hours  glucagon  Injectable 1 milliGRAM(s) IntraMuscular once  insulin glargine Injectable (LANTUS) 10 Unit(s) SubCutaneous at bedtime  insulin lispro (ADMELOG) corrective regimen sliding scale   SubCutaneous at bedtime  insulin lispro (ADMELOG) corrective regimen sliding scale   SubCutaneous three times a day before meals  insulin lispro Injectable (ADMELOG) 5 Unit(s) SubCutaneous three times a day before meals  lisinopril 40 milliGRAM(s) Oral daily  metoprolol succinate ER 25 milliGRAM(s) Oral daily  senna 2 Tablet(s) Oral at bedtime    MEDICATIONS  (PRN):  HYDROmorphone  Injectable 1 milliGRAM(s) IV Push every 4 hours PRN Severe Pain (7 - 10)  morphine  - Injectable 2 milliGRAM(s) IV Push every 6 hours PRN Moderate Pain (4 - 6)  polyethylene glycol 3350 17 Gram(s) Oral daily PRN Constipation  traMADol 50 milliGRAM(s) Oral every 6 hours PRN Mild Pain (1 - 3)      Allergies    No Known Allergies    Intolerances        Vital Signs Last 24 Hrs  T(C): 36.8 (20 Dec 2020 04:54), Max: 37.1 (19 Dec 2020 13:05)  T(F): 98.3 (20 Dec 2020 04:54), Max: 98.7 (19 Dec 2020 13:05)  HR: 77 (20 Dec 2020 04:54) (72 - 77)  BP: 132/74 (20 Dec 2020 04:54) (114/70 - 132/74)  BP(mean): 7 (19 Dec 2020 21:30) (7 - 7)  RR: 18 (20 Dec 2020 04:54) (18 - 18)  SpO2: 95% (20 Dec 2020 04:54) (95% - 98%)    I&O's Summary    19 Dec 2020 07:01  -  20 Dec 2020 07:00  --------------------------------------------------------  IN: 100 mL / OUT: 0 mL / NET: 100 mL          PHYSICAL EXAM:  TELE: not on tele   Constitutional: NAD, awake and alert, well-developed  HEENT: Moist Mucous Membranes, Anicteric  Pulmonary: Non-labored, breath sounds are clear bilaterally, No wheezing, crackles or rhonchi  Cardiovascular: Regular, S1 and S2 nl, + murmur   Gastrointestinal: Bowel Sounds present, soft, nontender.   Lymph: No lymphadenopathy. No peripheral edema.  Skin: foot dressing   Psych:  Mood & affect appropriate    LABS: All Labs Reviewed:                        11.9   10.31 )-----------( 233      ( 20 Dec 2020 06:28 )             36.7                         12.4   10.71 )-----------( 262      ( 19 Dec 2020 08:19 )             38.3                         13.5   10.00 )-----------( 324      ( 18 Dec 2020 07:54 )             40.6     20 Dec 2020 06:28    138    |  104    |  19     ----------------------------<  163    4.8     |  31     |  0.87   19 Dec 2020 08:19    140    |  107    |  23     ----------------------------<  159    5.0     |  29     |  0.91   18 Dec 2020 07:54    141    |  107    |  19     ----------------------------<  167    4.1     |  28     |  0.78     Ca    8.4        20 Dec 2020 06:28  Ca    8.4        19 Dec 2020 08:19  Ca    8.6        18 Dec 2020 07:54    TPro  6.8    /  Alb  3.5    /  TBili  0.3    /  DBili  x      /  AST  10     /  ALT  24     /  AlkPhos  61     18 Dec 2020 00:42             ECG:  < from: 12 Lead ECG (12.16.20 @ 09:37) >    Ventricular Rate 107 BPM    Atrial Rate 107 BPM    P-R Interval 130 ms    QRS Duration 82 ms    Q-T Interval 348 ms    QTC Calculation(Bazett) 464 ms    P Axis -2 degrees    R Axis -19 degrees    T Axis 19 degrees    Diagnosis Line Sinus tachycardia    < end of copied text >    Echo:    Radiology:

## 2022-03-08 ENCOUNTER — LAB ENCOUNTER (OUTPATIENT)
Dept: LAB | Facility: HOSPITAL | Age: 22
End: 2022-03-08
Attending: INTERNAL MEDICINE
Payer: COMMERCIAL

## 2022-03-08 ENCOUNTER — OFFICE VISIT (OUTPATIENT)
Dept: FAMILY MEDICINE CLINIC | Facility: CLINIC | Age: 22
End: 2022-03-08
Payer: COMMERCIAL

## 2022-03-08 VITALS
HEART RATE: 70 BPM | DIASTOLIC BLOOD PRESSURE: 70 MMHG | HEIGHT: 63 IN | WEIGHT: 132.38 LBS | SYSTOLIC BLOOD PRESSURE: 110 MMHG | OXYGEN SATURATION: 100 % | BODY MASS INDEX: 23.46 KG/M2

## 2022-03-08 DIAGNOSIS — Z00.00 PREVENTATIVE HEALTH CARE: ICD-10-CM

## 2022-03-08 DIAGNOSIS — E04.9 THYROID ENLARGED: ICD-10-CM

## 2022-03-08 DIAGNOSIS — R59.0 CERVICAL LYMPHADENOPATHY: Primary | ICD-10-CM

## 2022-03-08 LAB
ALBUMIN SERPL-MCNC: 4.3 G/DL (ref 3.4–5)
ALBUMIN/GLOB SERPL: 1.7 {RATIO} (ref 1–2)
ALP LIVER SERPL-CCNC: 55 U/L
ALT SERPL-CCNC: 22 U/L
ANION GAP SERPL CALC-SCNC: 5 MMOL/L (ref 0–18)
AST SERPL-CCNC: 12 U/L (ref 15–37)
BASOPHILS # BLD AUTO: 0.06 X10(3) UL (ref 0–0.2)
BASOPHILS NFR BLD AUTO: 0.8 %
BILIRUB SERPL-MCNC: 0.6 MG/DL (ref 0.1–2)
BUN BLD-MCNC: 14 MG/DL (ref 7–18)
BUN/CREAT SERPL: 23 (ref 10–20)
CALCIUM BLD-MCNC: 8.8 MG/DL (ref 8.5–10.1)
CHLORIDE SERPL-SCNC: 112 MMOL/L (ref 98–112)
CO2 SERPL-SCNC: 24 MMOL/L (ref 21–32)
CREAT BLD-MCNC: 0.61 MG/DL
DEPRECATED RDW RBC AUTO: 41.6 FL (ref 35.1–46.3)
EOSINOPHIL # BLD AUTO: 0.65 X10(3) UL (ref 0–0.7)
EOSINOPHIL NFR BLD AUTO: 8.7 %
ERYTHROCYTE [DISTWIDTH] IN BLOOD BY AUTOMATED COUNT: 11.9 % (ref 11–15)
FASTING STATUS PATIENT QL REPORTED: YES
GLOBULIN PLAS-MCNC: 2.5 G/DL (ref 2.8–4.4)
GLUCOSE BLD-MCNC: 77 MG/DL (ref 70–99)
HCT VFR BLD AUTO: 43.1 %
HGB BLD-MCNC: 14.2 G/DL
IMM GRANULOCYTES # BLD AUTO: 0.02 X10(3) UL (ref 0–1)
IMM GRANULOCYTES NFR BLD: 0.3 %
LYMPHOCYTES # BLD AUTO: 1.91 X10(3) UL (ref 1–4)
LYMPHOCYTES NFR BLD AUTO: 25.4 %
MCH RBC QN AUTO: 31.6 PG (ref 26–34)
MCHC RBC AUTO-ENTMCNC: 32.9 G/DL (ref 31–37)
MCV RBC AUTO: 95.8 FL
MONOCYTES # BLD AUTO: 0.38 X10(3) UL (ref 0.1–1)
MONOCYTES NFR BLD AUTO: 5.1 %
NEUTROPHILS # BLD AUTO: 4.49 X10 (3) UL (ref 1.5–7.7)
NEUTROPHILS # BLD AUTO: 4.49 X10(3) UL (ref 1.5–7.7)
NEUTROPHILS NFR BLD AUTO: 59.7 %
OSMOLALITY SERPL CALC.SUM OF ELEC: 291 MOSM/KG (ref 275–295)
PLATELET # BLD AUTO: 198 10(3)UL (ref 150–450)
POTASSIUM SERPL-SCNC: 4.1 MMOL/L (ref 3.5–5.1)
PROT SERPL-MCNC: 6.8 G/DL (ref 6.4–8.2)
RBC # BLD AUTO: 4.5 X10(6)UL
SODIUM SERPL-SCNC: 141 MMOL/L (ref 136–145)
T3 SERPL-MCNC: 126 NG/DL (ref 60–181)
T4 SERPL-MCNC: 10.5 UG/DL
TSI SER-ACNC: 1.49 MIU/ML (ref 0.36–3.74)
WBC # BLD AUTO: 7.5 X10(3) UL (ref 4–11)

## 2022-03-08 PROCEDURE — 3078F DIAST BP <80 MM HG: CPT | Performed by: INTERNAL MEDICINE

## 2022-03-08 PROCEDURE — 84443 ASSAY THYROID STIM HORMONE: CPT

## 2022-03-08 PROCEDURE — 3008F BODY MASS INDEX DOCD: CPT | Performed by: INTERNAL MEDICINE

## 2022-03-08 PROCEDURE — 84436 ASSAY OF TOTAL THYROXINE: CPT

## 2022-03-08 PROCEDURE — 85025 COMPLETE CBC W/AUTO DIFF WBC: CPT | Performed by: INTERNAL MEDICINE

## 2022-03-08 PROCEDURE — 80053 COMPREHEN METABOLIC PANEL: CPT

## 2022-03-08 PROCEDURE — 84480 ASSAY TRIIODOTHYRONINE (T3): CPT

## 2022-03-08 PROCEDURE — 99214 OFFICE O/P EST MOD 30 MIN: CPT | Performed by: INTERNAL MEDICINE

## 2022-03-08 PROCEDURE — 3074F SYST BP LT 130 MM HG: CPT | Performed by: INTERNAL MEDICINE

## 2022-03-08 PROCEDURE — 36415 COLL VENOUS BLD VENIPUNCTURE: CPT | Performed by: INTERNAL MEDICINE

## 2022-03-18 ENCOUNTER — HOSPITAL ENCOUNTER (OUTPATIENT)
Dept: ULTRASOUND IMAGING | Facility: HOSPITAL | Age: 22
Discharge: HOME | End: 2022-03-18
Attending: INTERNAL MEDICINE
Payer: COMMERCIAL

## 2022-03-18 DIAGNOSIS — E04.9 THYROID ENLARGED: ICD-10-CM

## 2022-03-18 DIAGNOSIS — R59.0 CERVICAL LYMPHADENOPATHY: ICD-10-CM

## 2022-03-18 PROCEDURE — 76536 US EXAM OF HEAD AND NECK: CPT | Performed by: INTERNAL MEDICINE

## 2022-03-21 ENCOUNTER — TELEPHONE (OUTPATIENT)
Dept: FAMILY MEDICINE CLINIC | Facility: CLINIC | Age: 22
End: 2022-03-21

## 2022-03-21 RX ORDER — NAPROXEN 500 MG/1
500 TABLET ORAL 2 TIMES DAILY WITH MEALS
Qty: 30 TABLET | Refills: 0 | Status: SHIPPED | OUTPATIENT
Start: 2022-03-21

## 2022-03-21 NOTE — TELEPHONE ENCOUNTER
Spoke to patient. She does not have access to [facility] and thus unable to view results. She was informed of lab results as noted below. This RN verbally requested from Dr. Vinny Donohue to review US. Per patient, she doesn't have a hard time breathing - states that her \"throat feels really tight at the top. \" Losing voice. No trouble swallowing. She also feels like there's \"something hanging under her chin\" and is hardened. Not sure if she overextends her neck or what could be causing this issue. She was informed that she will be contacted with US results and plans moving forward.

## 2022-03-21 NOTE — TELEPHONE ENCOUNTER
Spoke to patient. Informed of US results as noted below. Will take medication for a week. Appt scheduled for 3/29 at 11:20 with Dr. Dominic Childers for f/up.

## 2022-03-21 NOTE — TELEPHONE ENCOUNTER
----- Message from Idalia Fowler MD sent at 3/21/2022  2:45 PM CDT -----  US did not show any evidence of infection of mass. Possible thyroiditis noted. Have her start naproxen 500 mg BID for 7 days. Take with food. See me in 1-2 weeks for symptoms evaluation.

## 2022-03-21 NOTE — TELEPHONE ENCOUNTER
Pt is calling stating that neck is swollen, discomfort.hard time breathing. Pt was notified to go to emergency. She also wanted to know test results.         Please call and advise

## 2022-04-20 ENCOUNTER — TELEPHONE (OUTPATIENT)
Dept: INTERNAL MEDICINE CLINIC | Facility: CLINIC | Age: 22
End: 2022-04-20

## 2022-07-27 ENCOUNTER — APPOINTMENT (OUTPATIENT)
Dept: GENERAL RADIOLOGY | Age: 22
End: 2022-07-27
Attending: EMERGENCY MEDICINE

## 2022-07-27 ENCOUNTER — HOSPITAL ENCOUNTER (EMERGENCY)
Age: 22
Discharge: LEFT BEFORE DISCHARGE | End: 2022-07-27

## 2022-07-27 VITALS
HEART RATE: 65 BPM | OXYGEN SATURATION: 98 % | WEIGHT: 126 LBS | TEMPERATURE: 97.7 F | SYSTOLIC BLOOD PRESSURE: 102 MMHG | RESPIRATION RATE: 18 BRPM | DIASTOLIC BLOOD PRESSURE: 68 MMHG

## 2022-07-27 LAB
ALBUMIN SERPL-MCNC: 4 G/DL (ref 3.6–5.1)
ALBUMIN/GLOB SERPL: 1.5 {RATIO} (ref 1–2.4)
ALP SERPL-CCNC: 48 UNITS/L (ref 45–117)
ALT SERPL-CCNC: 14 UNITS/L
ANION GAP SERPL CALC-SCNC: 10 MMOL/L (ref 7–19)
AST SERPL-CCNC: 8 UNITS/L
BASOPHILS # BLD: 0 K/MCL (ref 0–0.3)
BASOPHILS NFR BLD: 1 %
BILIRUB SERPL-MCNC: 0.4 MG/DL (ref 0.2–1)
BUN SERPL-MCNC: 13 MG/DL (ref 6–20)
BUN/CREAT SERPL: 21 (ref 7–25)
CALCIUM SERPL-MCNC: 9 MG/DL (ref 8.4–10.2)
CHLORIDE SERPL-SCNC: 111 MMOL/L (ref 97–110)
CO2 SERPL-SCNC: 24 MMOL/L (ref 21–32)
CREAT SERPL-MCNC: 0.62 MG/DL (ref 0.51–0.95)
DEPRECATED RDW RBC: 42.4 FL (ref 39–50)
EOSINOPHIL # BLD: 0.2 K/MCL (ref 0–0.5)
EOSINOPHIL NFR BLD: 4 %
ERYTHROCYTE [DISTWIDTH] IN BLOOD: 12.4 % (ref 11–15)
FASTING DURATION TIME PATIENT: ABNORMAL H
GFR SERPLBLD BASED ON 1.73 SQ M-ARVRAT: >90 ML/MIN
GLOBULIN SER-MCNC: 2.6 G/DL (ref 2–4)
GLUCOSE SERPL-MCNC: 89 MG/DL (ref 70–99)
HCT VFR BLD CALC: 39.7 % (ref 36–46.5)
HGB BLD-MCNC: 13.5 G/DL (ref 12–15.5)
IMM GRANULOCYTES # BLD AUTO: 0 K/MCL (ref 0–0.2)
IMM GRANULOCYTES # BLD: 0 %
LYMPHOCYTES # BLD: 1.3 K/MCL (ref 1–4.8)
LYMPHOCYTES NFR BLD: 25 %
MCH RBC QN AUTO: 31.8 PG (ref 26–34)
MCHC RBC AUTO-ENTMCNC: 34 G/DL (ref 32–36.5)
MCV RBC AUTO: 93.4 FL (ref 78–100)
MONOCYTES # BLD: 0.3 K/MCL (ref 0.3–0.9)
MONOCYTES NFR BLD: 6 %
NEUTROPHILS # BLD: 3.4 K/MCL (ref 1.8–7.7)
NEUTROPHILS NFR BLD: 64 %
NRBC BLD MANUAL-RTO: 0 /100 WBC
PLATELET # BLD AUTO: 174 K/MCL (ref 140–450)
POTASSIUM SERPL-SCNC: 4 MMOL/L (ref 3.4–5.1)
PROT SERPL-MCNC: 6.6 G/DL (ref 6.4–8.2)
RAINBOW EXTRA TUBES HOLD SPECIMEN: NORMAL
RBC # BLD: 4.25 MIL/MCL (ref 4–5.2)
SODIUM SERPL-SCNC: 141 MMOL/L (ref 135–145)
TROPONIN I SERPL DL<=0.01 NG/ML-MCNC: <4 NG/L
WBC # BLD: 5.3 K/MCL (ref 4.2–11)

## 2022-07-27 PROCEDURE — 10003627 HB COUNTER ED NO SERVICE

## 2022-07-27 PROCEDURE — 85025 COMPLETE CBC W/AUTO DIFF WBC: CPT | Performed by: EMERGENCY MEDICINE

## 2022-07-27 PROCEDURE — 93005 ELECTROCARDIOGRAM TRACING: CPT | Performed by: EMERGENCY MEDICINE

## 2022-07-27 PROCEDURE — 84484 ASSAY OF TROPONIN QUANT: CPT | Performed by: EMERGENCY MEDICINE

## 2022-07-27 PROCEDURE — 36415 COLL VENOUS BLD VENIPUNCTURE: CPT

## 2022-07-27 PROCEDURE — 80053 COMPREHEN METABOLIC PANEL: CPT | Performed by: EMERGENCY MEDICINE

## 2022-07-27 PROCEDURE — 71045 X-RAY EXAM CHEST 1 VIEW: CPT

## 2022-07-27 ASSESSMENT — PAIN SCALES - GENERAL: PAINLEVEL_OUTOF10: 5

## 2022-07-28 LAB
ATRIAL RATE (BPM): 60
P AXIS (DEGREES): 55
PR-INTERVAL (MSEC): 228
QRS-INTERVAL (MSEC): 84
QT-INTERVAL (MSEC): 408
QTC: 408
R AXIS (DEGREES): 72
REPORT TEXT: NORMAL
T AXIS (DEGREES): 60
VENTRICULAR RATE EKG/MIN (BPM): 60

## 2023-01-25 ENCOUNTER — TELEPHONE (OUTPATIENT)
Dept: INTERNAL MEDICINE CLINIC | Facility: CLINIC | Age: 23
End: 2023-01-25

## 2023-01-25 NOTE — TELEPHONE ENCOUNTER
Pt is calling asking for note to go back to work on January 31. Pt states she has to turn in before January 27.        Please call and advise

## 2023-01-25 NOTE — TELEPHONE ENCOUNTER
Patient needs appt to be release to work, per Dr Pam Maldonado make her an in person appt. No excuse to be given for the 6 months but can possibly get the note to return to work with no restrictions. Patient was notified and took an appt to be seen Monday.

## 2023-01-25 NOTE — TELEPHONE ENCOUNTER
Patient was dx anorexia and has been off for 6 months, her psychiatrist is asking PCP to authorize the return to work note.

## 2024-03-15 ENCOUNTER — HOSPITAL ENCOUNTER (EMERGENCY)
Facility: HOSPITAL | Age: 24
Discharge: HOME | End: 2024-03-15
Attending: STUDENT IN AN ORGANIZED HEALTH CARE EDUCATION/TRAINING PROGRAM
Payer: COMMERCIAL

## 2024-03-15 ENCOUNTER — APPOINTMENT (OUTPATIENT)
Dept: ULTRASOUND IMAGING | Facility: HOSPITAL | Age: 24
End: 2024-03-15
Attending: STUDENT IN AN ORGANIZED HEALTH CARE EDUCATION/TRAINING PROGRAM
Payer: COMMERCIAL

## 2024-03-15 VITALS
WEIGHT: 154 LBS | HEIGHT: 63 IN | OXYGEN SATURATION: 98 % | DIASTOLIC BLOOD PRESSURE: 65 MMHG | SYSTOLIC BLOOD PRESSURE: 104 MMHG | HEART RATE: 71 BPM | TEMPERATURE: 98 F | RESPIRATION RATE: 16 BRPM | BODY MASS INDEX: 27.29 KG/M2

## 2024-03-15 DIAGNOSIS — R53.83 OTHER FATIGUE: ICD-10-CM

## 2024-03-15 DIAGNOSIS — K29.00 ACUTE GASTRITIS WITHOUT HEMORRHAGE, UNSPECIFIED GASTRITIS TYPE: Primary | ICD-10-CM

## 2024-03-15 LAB
ALBUMIN SERPL-MCNC: 4.6 G/DL (ref 3.2–4.8)
ALBUMIN/GLOB SERPL: 2.4 {RATIO} (ref 1–2)
ALP LIVER SERPL-CCNC: 64 U/L
ALT SERPL-CCNC: 27 U/L
ANION GAP SERPL CALC-SCNC: 5 MMOL/L (ref 0–18)
AST SERPL-CCNC: 18 U/L (ref ?–34)
B-HCG UR QL: NEGATIVE
BASOPHILS # BLD AUTO: 0.03 X10(3) UL (ref 0–0.2)
BASOPHILS NFR BLD AUTO: 0.5 %
BILIRUB SERPL-MCNC: 0.2 MG/DL (ref 0.3–1.2)
BILIRUB UR QL: NEGATIVE
BUN BLD-MCNC: 13 MG/DL (ref 9–23)
BUN/CREAT SERPL: 16.5 (ref 10–20)
CALCIUM BLD-MCNC: 9.5 MG/DL (ref 8.7–10.4)
CHLORIDE SERPL-SCNC: 111 MMOL/L (ref 98–112)
CO2 SERPL-SCNC: 26 MMOL/L (ref 21–32)
COLOR UR: YELLOW
CREAT BLD-MCNC: 0.79 MG/DL
DEPRECATED RDW RBC AUTO: 39.9 FL (ref 35.1–46.3)
EGFRCR SERPLBLD CKD-EPI 2021: 106 ML/MIN/1.73M2 (ref 60–?)
EOSINOPHIL # BLD AUTO: 0.11 X10(3) UL (ref 0–0.7)
EOSINOPHIL NFR BLD AUTO: 2 %
ERYTHROCYTE [DISTWIDTH] IN BLOOD BY AUTOMATED COUNT: 12.2 % (ref 11–15)
GLOBULIN PLAS-MCNC: 1.9 G/DL (ref 2.8–4.4)
GLUCOSE BLD-MCNC: 84 MG/DL (ref 70–99)
GLUCOSE UR-MCNC: NORMAL MG/DL
HCT VFR BLD AUTO: 41.7 %
HGB BLD-MCNC: 14.2 G/DL
HGB UR QL STRIP.AUTO: NEGATIVE
IMM GRANULOCYTES # BLD AUTO: 0.01 X10(3) UL (ref 0–1)
IMM GRANULOCYTES NFR BLD: 0.2 %
LEUKOCYTE ESTERASE UR QL STRIP.AUTO: 75
LIPASE SERPL-CCNC: 42 U/L (ref 13–75)
LYMPHOCYTES # BLD AUTO: 1.63 X10(3) UL (ref 1–4)
LYMPHOCYTES NFR BLD AUTO: 29.8 %
MCH RBC QN AUTO: 30.5 PG (ref 26–34)
MCHC RBC AUTO-ENTMCNC: 34.1 G/DL (ref 31–37)
MCV RBC AUTO: 89.7 FL
MONOCYTES # BLD AUTO: 0.33 X10(3) UL (ref 0.1–1)
MONOCYTES NFR BLD AUTO: 6 %
NEUTROPHILS # BLD AUTO: 3.36 X10 (3) UL (ref 1.5–7.7)
NEUTROPHILS # BLD AUTO: 3.36 X10(3) UL (ref 1.5–7.7)
NEUTROPHILS NFR BLD AUTO: 61.5 %
NITRITE UR QL STRIP.AUTO: NEGATIVE
OSMOLALITY SERPL CALC.SUM OF ELEC: 293 MOSM/KG (ref 275–295)
PH UR: 6.5 [PH] (ref 5–8)
PLATELET # BLD AUTO: 201 10(3)UL (ref 150–450)
POTASSIUM SERPL-SCNC: 4.4 MMOL/L (ref 3.5–5.1)
PROT SERPL-MCNC: 6.5 G/DL (ref 5.7–8.2)
PROT UR-MCNC: 30 MG/DL
RBC # BLD AUTO: 4.65 X10(6)UL
SODIUM SERPL-SCNC: 142 MMOL/L (ref 136–145)
SP GR UR STRIP: >1.03 (ref 1–1.03)
UROBILINOGEN UR STRIP-ACNC: 2
WBC # BLD AUTO: 5.5 X10(3) UL (ref 4–11)

## 2024-03-15 PROCEDURE — 84481 FREE ASSAY (FT-3): CPT

## 2024-03-15 PROCEDURE — S0028 INJECTION, FAMOTIDINE, 20 MG: HCPCS | Performed by: STUDENT IN AN ORGANIZED HEALTH CARE EDUCATION/TRAINING PROGRAM

## 2024-03-15 PROCEDURE — 84439 ASSAY OF FREE THYROXINE: CPT

## 2024-03-15 PROCEDURE — 81025 URINE PREGNANCY TEST: CPT

## 2024-03-15 PROCEDURE — 80053 COMPREHEN METABOLIC PANEL: CPT | Performed by: STUDENT IN AN ORGANIZED HEALTH CARE EDUCATION/TRAINING PROGRAM

## 2024-03-15 PROCEDURE — 87086 URINE CULTURE/COLONY COUNT: CPT | Performed by: STUDENT IN AN ORGANIZED HEALTH CARE EDUCATION/TRAINING PROGRAM

## 2024-03-15 PROCEDURE — 99284 EMERGENCY DEPT VISIT MOD MDM: CPT

## 2024-03-15 PROCEDURE — 76705 ECHO EXAM OF ABDOMEN: CPT | Performed by: STUDENT IN AN ORGANIZED HEALTH CARE EDUCATION/TRAINING PROGRAM

## 2024-03-15 PROCEDURE — 85025 COMPLETE CBC W/AUTO DIFF WBC: CPT | Performed by: STUDENT IN AN ORGANIZED HEALTH CARE EDUCATION/TRAINING PROGRAM

## 2024-03-15 PROCEDURE — 96361 HYDRATE IV INFUSION ADD-ON: CPT

## 2024-03-15 PROCEDURE — 83690 ASSAY OF LIPASE: CPT | Performed by: STUDENT IN AN ORGANIZED HEALTH CARE EDUCATION/TRAINING PROGRAM

## 2024-03-15 PROCEDURE — 84443 ASSAY THYROID STIM HORMONE: CPT

## 2024-03-15 PROCEDURE — 96374 THER/PROPH/DIAG INJ IV PUSH: CPT

## 2024-03-15 PROCEDURE — 81001 URINALYSIS AUTO W/SCOPE: CPT | Performed by: STUDENT IN AN ORGANIZED HEALTH CARE EDUCATION/TRAINING PROGRAM

## 2024-03-15 PROCEDURE — 99285 EMERGENCY DEPT VISIT HI MDM: CPT

## 2024-03-15 PROCEDURE — 96375 TX/PRO/DX INJ NEW DRUG ADDON: CPT

## 2024-03-15 RX ORDER — ONDANSETRON 2 MG/ML
4 INJECTION INTRAMUSCULAR; INTRAVENOUS ONCE
Status: COMPLETED | OUTPATIENT
Start: 2024-03-15 | End: 2024-03-15

## 2024-03-15 RX ORDER — FAMOTIDINE 10 MG/ML
20 INJECTION, SOLUTION INTRAVENOUS ONCE
Status: COMPLETED | OUTPATIENT
Start: 2024-03-15 | End: 2024-03-15

## 2024-03-15 RX ORDER — FAMOTIDINE 20 MG/1
20 TABLET, FILM COATED ORAL 2 TIMES DAILY PRN
Qty: 30 TABLET | Refills: 0 | Status: SHIPPED | OUTPATIENT
Start: 2024-03-15 | End: 2024-04-14

## 2024-03-15 RX ORDER — MORPHINE SULFATE 4 MG/ML
4 INJECTION, SOLUTION INTRAMUSCULAR; INTRAVENOUS ONCE
Status: COMPLETED | OUTPATIENT
Start: 2024-03-15 | End: 2024-03-15

## 2024-03-15 NOTE — ED INITIAL ASSESSMENT (HPI)
Pt presents to the ER with c/o LUQ pain intermittently x 2 years. States usually pain will go away after 2-3 days however this time it has been hurting for last 5 days. Also c/o n/v

## 2024-03-15 NOTE — DISCHARGE INSTRUCTIONS
Thank you for seeking care at [facility] Emergency Department  You have been seen and evaluated for abdominal pain and discomfort.    In the emergency department, you had labs, urinalysis, ultrasound of the gallbladder  Your testing did not show severe findings, except as noted: None    Read all instructions provided.    Remember, your care process does not end after your visit today. Please follow-up with your doctor within 1-2 days for a follow-up visit to ensure your symptoms are improving, to see if you need any further evaluation/testing, or to evaluate for any alternate diagnoses. Return to the emergency department if you develop severe abdominal pain, severe nausea and vomiting to the point where you are unable to keep down fluids, if you develop chest pain or difficulty breathing, blood in your stool, dizziness or fainting, or if you develop any other new or concerning symptoms as these could be signs of more serious medical illness. Try to stay well hydrated.

## 2024-03-16 NOTE — ED PROVIDER NOTES
[place] [facility] Note  Patient: Dariana Henderson Age: 23 year old Sex: female      MRN: [id number]  : 2000    Patient Seen in: [facility] Emergency Department    History     Chief Complaint   Patient presents with    Abdomen/Flank Pain     Stated Complaint: Abdominal Pain    History obtained from: Patient    23-year-old female with a past medical history of bipolar affective disorder, PTSD presenting today for evaluation of left upper quadrant abdominal pain.  States that she is been having pain intermittently over the past several years.  She states that she has been having constant pain over the past 2 to 3 days.  She states that she has pain in her epigastrium that radiates to the left upper quadrant.  Associated with nausea and vomiting.  No diarrhea.  No fevers.  States that pain is worse after eating.    Review of Systems:  Review of Systems  Positive for stated complaint: Abdominal Pain. Constitutional and vital signs reviewed. All other systems reviewed and negative except as noted above.    Patient History:  Past Medical History:   Diagnosis Date    Bipolar affective (HCC)     History of substance abuse (HCC)     PTSD (post-traumatic stress disorder)        History reviewed. No pertinent surgical history.     History reviewed. No pertinent family history.    Specific Social Determinants of Health:   Social History     Socioeconomic History    Marital status: Single   Tobacco Use    Smoking status: Former     Types: Cigarettes     Start date: 10/21/2012    Smokeless tobacco: Never    Tobacco comments:     Vape    Substance and Sexual Activity    Alcohol use: No     Alcohol/week: 0.0 standard drinks of alcohol    Drug use: Yes     Types: Cannabis    Sexual activity: Never   Other Topics Concern    Blood Transfusions No    Caffeine Concern No    Occupational Exposure No    Hobby Hazards No    Sleep Concern No    Stress Concern No    Weight Concern No    Exercise Yes    Bike Helmet  Yes    Seat Belt Yes    Self-Exams Yes           PSFH elements reviewed from today and agreed except as otherwise stated in HPI.    Physical Exam     ED Triage Vitals [03/15/24 1116]   /83   Pulse 117   Resp 18   Temp 98.3 °F (36.8 °C)   Temp src Oral   SpO2 98 %   O2 Device None (Room air)       Current:/65   Pulse 71   Temp 98.3 °F (36.8 °C) (Oral)   Resp 16   Ht 160 cm (5' 3\")   Wt 69.9 kg   LMP 02/28/2024 (Approximate)   SpO2 98%   BMI 27.28 kg/m²         Physical Exam  Constitutional:       General: She is not in acute distress.  HENT:      Head: Normocephalic and atraumatic.      Mouth/Throat:      Mouth: Mucous membranes are moist.   Eyes:      Extraocular Movements: Extraocular movements intact.   Cardiovascular:      Rate and Rhythm: Normal rate and regular rhythm.      Heart sounds: Normal heart sounds.   Pulmonary:      Effort: Pulmonary effort is normal. No respiratory distress.      Breath sounds: Normal breath sounds.   Abdominal:      Palpations: Abdomen is soft.      Tenderness: There is abdominal tenderness in the right upper quadrant and epigastric area.   Skin:     General: Skin is warm and dry.      Capillary Refill: Capillary refill takes less than 2 seconds.      Findings: No rash.   Neurological:      General: No focal deficit present.      Mental Status: She is alert and oriented to person, place, and time.   Psychiatric:         Mood and Affect: Mood normal.         Behavior: Behavior normal.         ED Course   Labs:   Labs Reviewed   COMP METABOLIC PANEL (14) - Abnormal; Notable for the following components:       Result Value    Bilirubin, Total 0.2 (*)     Globulin  1.9 (*)     A/G Ratio 2.4 (*)     All other components within normal limits   URINALYSIS WITH CULTURE REFLEX - Abnormal; Notable for the following components:    Clarity Urine Turbid (*)     Spec Gravity >1.030 (*)     Ketones Urine Trace (*)     Protein Urine 30 (*)     Urobilinogen Urine 2 (*)      Leukocyte Esterase Urine 75 (*)     Bacteria Urine Rare (*)     Squamous Epi. Cells Few (*)     Ca Oxalate Crystals Few (*)     All other components within normal limits   LIPASE - Normal   POCT PREGNANCY URINE - Normal   CBC WITH DIFFERENTIAL WITH PLATELET    Narrative:     The following orders were created for panel order CBC With Differential With Platelet.  Procedure                               Abnormality         Status                     ---------                               -----------         ------                     CBC W/ DIFFERENTIAL[[id number]]                              Final result                 Please view results for these tests on the individual orders.   URINE CULTURE, ROUTINE   CBC W/ DIFFERENTIAL     Radiology findings:  I personally reviewed the images.   US GALLBLADDER (CPT=76705)    Result Date: 3/15/2024  CONCLUSION:  1. Normal gallbladder ultrasound.  Normal common duct. 2. No focal liver lesions.  Visualized portions pancreas unremarkable 3. No free fluid hepatorenal fossa. No hydronephrosis right kidney.    Dictated by (CST): Wenceslao Bautista MD on 3/15/2024 at 1:15 PM     Finalized by (CST): Wenceslao Bautista MD on 3/15/2024 at 1:18 PM             Cardiac Monitor: Interpreted by me.   Pulse Readings from Last 1 Encounters:   03/15/24 71   , sinus,     External non-ED records reviewed independently by me: CMP from 7/27/2022 reviewed with baseline T. bili of 0.4.    MDM   23-year-old female with a past medical history of bipolar affective disorder, PTSD presenting for evaluation of upper abdominal pain associated nausea and vomiting over the past 2 to 3 days.  Upon arrival to emergency department, well-appearing and in no acute distress.  Reproducible epigastric and right upper quadrant abdominal tenderness no rebound or guarding.    Differential diagnoses considered includes, but is not limited to: Pancreatitis, gallstones, biliary colic, gastritis, biliary obstruction,  cholecystitis, gastritis    Will obtain the following tests: CBC, CMP, lipase, urinalysis, ultrasound gallbladder  Please see ED course for my independent review of these tests/imaging results.    Initial Medications/Therapeutics administered: Pepcid, IV fluids, Zofran, morphine    Chronic conditions affecting care: Bipolar affective disorder, PTSD    Workup and medications considered but not ordered: None    Social Determinants of Health that impacted care: None    ED Course as of 03/15/24 2120  ------------------------------------------------------------  Time: 03/15 1330  Comment: Been reviewed this ultrasound images that show no evidence of gallstones or cholecystitis.  Agree with radiology read above.  Labs reassuring with no evidence of biliary obstructive process.  Urinalysis with likely contaminated sample.  Patient has no urinary symptoms.  Upon reevaluation, noted to have resolution of symptoms.  Stable for discharge home at this time with course of Pepcid and close GI follow-up.  Return precautions were provided and all questions answered.  Patient expressed understanding and agreement with this plan            Disposition and Plan     Clinical Impression:  1. Acute gastritis without hemorrhage, unspecified gastritis type        Disposition:  Discharge    Follow-up:  Alex Penny MD  [address] [facility] [address]  [place] [address]  [phone]    Schedule an appointment as soon as possible for a visit in 2 day(s)  As needed, If symptoms worsen    Celeste Reeves MD  [address]  [place] [address]  [phone]    Schedule an appointment as soon as possible for a visit in 1 week(s)  As needed, If symptoms worsen      Medications Prescribed:  Discharge Medication List as of 3/15/2024  1:49 PM        START taking these medications    Details   famotidine (PEPCID) 20 MG Oral Tab Take 1 tablet (20 mg total) by mouth 2 (two) times daily as needed for Heartburn., Normal, Disp-30 tablet, R-0                This note may have been created using voice dictation technology and may include inadvertent errors.      Vicky Lentz MD  Emergency Medicine

## 2024-03-18 ENCOUNTER — OFFICE VISIT (OUTPATIENT)
Dept: INTERNAL MEDICINE CLINIC | Facility: CLINIC | Age: 24
End: 2024-03-18
Payer: COMMERCIAL

## 2024-03-18 VITALS
BODY MASS INDEX: 28.17 KG/M2 | TEMPERATURE: 98 F | OXYGEN SATURATION: 99 % | WEIGHT: 161 LBS | HEIGHT: 63.5 IN | HEART RATE: 77 BPM | SYSTOLIC BLOOD PRESSURE: 92 MMHG | DIASTOLIC BLOOD PRESSURE: 60 MMHG

## 2024-03-18 DIAGNOSIS — K29.70 GASTRITIS WITHOUT BLEEDING, UNSPECIFIED CHRONICITY, UNSPECIFIED GASTRITIS TYPE: ICD-10-CM

## 2024-03-18 DIAGNOSIS — R53.83 OTHER FATIGUE: Primary | ICD-10-CM

## 2024-03-18 DIAGNOSIS — Z12.4 PAP SMEAR FOR CERVICAL CANCER SCREENING: ICD-10-CM

## 2024-03-18 LAB
T3FREE SERPL-MCNC: 2.78 PG/ML (ref 2.4–4.2)
T4 FREE SERPL-MCNC: 1.3 NG/DL (ref 0.8–1.7)
TSI SER-ACNC: 0.4 MIU/ML (ref 0.55–4.78)

## 2024-03-18 PROCEDURE — 3074F SYST BP LT 130 MM HG: CPT | Performed by: INTERNAL MEDICINE

## 2024-03-18 PROCEDURE — 99214 OFFICE O/P EST MOD 30 MIN: CPT | Performed by: INTERNAL MEDICINE

## 2024-03-18 PROCEDURE — 3078F DIAST BP <80 MM HG: CPT | Performed by: INTERNAL MEDICINE

## 2024-03-18 PROCEDURE — 3008F BODY MASS INDEX DOCD: CPT | Performed by: INTERNAL MEDICINE

## 2024-03-18 RX ORDER — SENNOSIDES A AND B 8.6 MG/1
1 TABLET, FILM COATED ORAL DAILY PRN
Qty: 30 TABLET | Refills: 1 | Status: SHIPPED | OUTPATIENT
Start: 2024-03-18

## 2024-03-18 NOTE — PROGRESS NOTES
[facility]  Return Patient Progress Note      HPI:     Chief Complaint   Patient presents with    Other     Gastro issues    Physical       Dariana Henderson is a 23 year old female presenting for: follow up.  Has a significant  has a past medical history of Bipolar affective (HCC), History of substance abuse (HCC), and PTSD (post-traumatic stress disorder).     Hx of anorexia nervosa per report.  She is currently seeing specialist (psych) for treatment and monitoring.  Following closely with dietician.      Recent ER evaluation for abd pain.  Diagnosed with gastritis.  Doig better on famotidie.        Labs:   CMP:  Lab Results   Component Value Date/Time     03/15/2024 12:11 PM    K 4.4 03/15/2024 12:11 PM     03/15/2024 12:11 PM    CO2 26.0 03/15/2024 12:11 PM    CREATSERUM 0.79 03/15/2024 12:11 PM    CA 9.5 03/15/2024 12:11 PM    GLU 84 03/15/2024 12:11 PM    TP 6.5 03/15/2024 12:11 PM    ALB 4.6 03/15/2024 12:11 PM    ALKPHO 64 03/15/2024 12:11 PM    AST 18 03/15/2024 12:11 PM    ALT 27 03/15/2024 12:11 PM    BILT 0.2 (L) 03/15/2024 12:11 PM    TSH 1.490 03/08/2022 10:01 AM    T4F 1.0 03/12/2020 02:45 PM        Hemoglobin A1C, Microalbumin  No results found for: \"A1C\"     Lipid panel  Lab Results   Component Value Date/Time    CHOLEST 155 10/01/2019 03:09 PM    HDL 55 10/01/2019 03:09 PM    TRIG 104 10/01/2019 03:09 PM    LDL 79 10/01/2019 03:09 PM    NONHDLC 100 10/01/2019 03:09 PM        Medications:  Current Outpatient Medications   Medication Sig Dispense Refill    famotidine (PEPCID) 20 MG Oral Tab Take 1 tablet (20 mg total) by mouth 2 (two) times daily as needed for Heartburn. 30 tablet 0    ALPRAZolam 0.25 MG Oral Tab Take 0.25 mg by mouth Q12H. (Patient not taking: Reported on 3/18/2024)        PMH:  Past Medical History:   Diagnosis Date    Bipolar affective (HCC)     History of substance abuse (HCC)     PTSD (post-traumatic stress disorder)          PSH:  No past  surgical history on file.    Allergies:  No Known Allergies   Social History:  Social History     Socioeconomic History    Marital status: Single   Tobacco Use    Smoking status: Former     Types: Cigarettes     Start date: 10/21/2012    Smokeless tobacco: Never    Tobacco comments:     Vape    Substance and Sexual Activity    Alcohol use: No     Alcohol/week: 0.0 standard drinks of alcohol    Drug use: Yes     Types: Cannabis    Sexual activity: Never   Other Topics Concern    Blood Transfusions No    Caffeine Concern No    Occupational Exposure No    Hobby Hazards No    Sleep Concern No    Stress Concern No    Weight Concern No    Exercise Yes    Bike Helmet Yes    Seat Belt Yes    Self-Exams Yes      Family History:  No family history on file.           REVIEW OF SYSTEMS:   Review of Systems   Constitutional:  Negative for chills, fatigue, fever and unexpected weight change.   HENT:  Negative for congestion, ear pain, hearing loss, rhinorrhea, sinus pain and sore throat.    Eyes:  Negative for pain, redness and visual disturbance.   Respiratory:  Negative for apnea, cough, chest tightness, shortness of breath and wheezing.    Cardiovascular:  Negative for chest pain, palpitations and leg swelling.   Gastrointestinal:  Negative for abdominal distention, abdominal pain, blood in stool, constipation and nausea.   Endocrine: Negative for cold intolerance, heat intolerance and polyuria.   Genitourinary:  Negative for dysuria, hematuria and urgency. Menstrual problem: .diag.  Musculoskeletal:  Negative for arthralgias, back pain, gait problem, joint swelling, myalgias and neck pain.   Skin:  Negative for rash and wound.   Allergic/Immunologic: Negative for food allergies and immunocompromised state.   Neurological:  Negative for dizziness, seizures, facial asymmetry, speech difficulty, weakness, light-headedness, numbness and headaches.   Hematological:  Negative for adenopathy. Does not bruise/bleed easily.    Psychiatric/Behavioral:  Negative for behavioral problems, sleep disturbance and suicidal ideas. The patient is not nervous/anxious.             PHYSICAL EXAM:   BP 92/60 (BP Location: Right arm, Patient Position: Sitting, Cuff Size: adult)   Pulse 77   Temp 97.9 °F (36.6 °C) (Temporal)   Ht 5' 3.5\" (1.613 m)   Wt 161 lb (73 kg)   LMP 02/28/2024 (Approximate)   SpO2 99%   BMI 28.07 kg/m²  Estimated body mass index is 28.07 kg/m² as calculated from the following:    Height as of this encounter: 5' 3.5\" (1.613 m).    Weight as of this encounter: 161 lb (73 kg).     Wt Readings from Last 3 Encounters:   03/18/24 161 lb (73 kg)   03/15/24 154 lb (69.9 kg)   01/30/23 135 lb (61.2 kg)       Physical Exam  Vitals reviewed.   Constitutional:       General: She is not in acute distress.     Appearance: She is well-developed.   HENT:      Head: Normocephalic and atraumatic.   Eyes:      Conjunctiva/sclera: Conjunctivae normal.      Pupils: Pupils are equal, round, and reactive to light.   Neck:      Thyroid: No thyromegaly.   Cardiovascular:      Rate and Rhythm: Normal rate and regular rhythm.      Heart sounds: Normal heart sounds, S1 normal and S2 normal. No murmur heard.     No friction rub. No gallop.   Pulmonary:      Effort: Pulmonary effort is normal. No respiratory distress.      Breath sounds: Normal breath sounds. No wheezing or rales.   Chest:      Chest wall: No tenderness.   Abdominal:      General: Bowel sounds are normal. There is no distension.      Palpations: Abdomen is soft. There is no mass.      Tenderness: There is no abdominal tenderness. There is no guarding or rebound.   Musculoskeletal:         General: No tenderness. Normal range of motion.      Cervical back: Normal range of motion.   Lymphadenopathy:      Cervical: No cervical adenopathy.   Skin:     General: Skin is warm.      Findings: No erythema or rash.   Neurological:      Mental Status: She is alert and oriented to person, place,  and time.      Cranial Nerves: No cranial nerve deficit.      Deep Tendon Reflexes: Reflexes are normal and symmetric.   Psychiatric:         Behavior: Behavior normal.         Thought Content: Thought content normal.         Judgment: Judgment normal.               ASSESSMENT AND PLAN:   Patient is a 23 year old female who presents primarily presents for:    (R53.83) Other fatigue  (primary encounter diagnosis)  Plan: TSH W Reflex To Free T4 [E]            (Z12.4) Pap smear for cervical cancer screening  Plan: OBG Referral - In Network            (K29.70) Gastritis without bleeding, unspecified chronicity, unspecified gastritis type  Comment: Abdominal pain improved with famotidine.  Plan:               Meds & Refills for this Visit:  Requested Prescriptions      No prescriptions requested or ordered in this encounter       No orders of the defined types were placed in this encounter.      Imaging & Consults:  None        Total time spent was 30 minutes which includes: Preparation to see patient including chart review, reviewing appropriate medical history, counseling and education (diet and exercise), discussing treatment options, ordering appropriate diagnostic tests and documentation.          Patient indicates understanding of the above recommendations and agrees to the above plan.  Reasurrance and education provided. All questions answered.  Notified to call with any questions, complications, allergies, or worsening or changing symptoms as well as any side effects or complications from the treatments .  Red flags/ ER precautions discussed.          Alex Penny MD

## 2024-05-12 ENCOUNTER — APPOINTMENT (OUTPATIENT)
Dept: ULTRASOUND IMAGING | Facility: HOSPITAL | Age: 24
End: 2024-05-12
Attending: NURSE PRACTITIONER

## 2024-05-12 ENCOUNTER — HOSPITAL ENCOUNTER (EMERGENCY)
Facility: HOSPITAL | Age: 24
Discharge: HOME | End: 2024-05-12

## 2024-05-12 VITALS
SYSTOLIC BLOOD PRESSURE: 108 MMHG | TEMPERATURE: 97 F | RESPIRATION RATE: 24 BRPM | HEIGHT: 62 IN | WEIGHT: 150 LBS | BODY MASS INDEX: 27.6 KG/M2 | OXYGEN SATURATION: 97 % | HEART RATE: 66 BPM | DIASTOLIC BLOOD PRESSURE: 66 MMHG

## 2024-05-12 DIAGNOSIS — R10.11 RUQ PAIN: Primary | ICD-10-CM

## 2024-05-12 LAB
ALBUMIN SERPL-MCNC: 4.6 G/DL (ref 3.2–4.8)
ALBUMIN/GLOB SERPL: 2.7 {RATIO} (ref 1–2)
ALP LIVER SERPL-CCNC: 50 U/L
ALT SERPL-CCNC: 21 U/L
ANION GAP SERPL CALC-SCNC: 6 MMOL/L (ref 0–18)
AST SERPL-CCNC: 17 U/L (ref ?–34)
B-HCG UR QL: NEGATIVE
BASOPHILS # BLD AUTO: 0.04 X10(3) UL (ref 0–0.2)
BASOPHILS NFR BLD AUTO: 0.6 %
BILIRUB SERPL-MCNC: 0.4 MG/DL (ref 0.3–1.2)
BILIRUB UR QL: NEGATIVE
BUN BLD-MCNC: 16 MG/DL (ref 9–23)
BUN/CREAT SERPL: 18 (ref 10–20)
CALCIUM BLD-MCNC: 9.1 MG/DL (ref 8.7–10.4)
CHLORIDE SERPL-SCNC: 112 MMOL/L (ref 98–112)
CLARITY UR: CLEAR
CO2 SERPL-SCNC: 24 MMOL/L (ref 21–32)
COLOR UR: YELLOW
CREAT BLD-MCNC: 0.89 MG/DL
DEPRECATED RDW RBC AUTO: 39.9 FL (ref 35.1–46.3)
EGFRCR SERPLBLD CKD-EPI 2021: 92 ML/MIN/1.73M2 (ref 60–?)
EOSINOPHIL # BLD AUTO: 0.14 X10(3) UL (ref 0–0.7)
EOSINOPHIL NFR BLD AUTO: 2 %
ERYTHROCYTE [DISTWIDTH] IN BLOOD BY AUTOMATED COUNT: 12.1 % (ref 11–15)
GLOBULIN PLAS-MCNC: 1.7 G/DL (ref 2–3.5)
GLUCOSE BLD-MCNC: 99 MG/DL (ref 70–99)
GLUCOSE UR-MCNC: NORMAL MG/DL
HCT VFR BLD AUTO: 38.2 %
HGB BLD-MCNC: 12.9 G/DL
HGB UR QL STRIP.AUTO: NEGATIVE
IMM GRANULOCYTES # BLD AUTO: 0.03 X10(3) UL (ref 0–1)
IMM GRANULOCYTES NFR BLD: 0.4 %
LEUKOCYTE ESTERASE UR QL STRIP.AUTO: NEGATIVE
LIPASE SERPL-CCNC: 59 U/L (ref 13–75)
LYMPHOCYTES # BLD AUTO: 1.72 X10(3) UL (ref 1–4)
LYMPHOCYTES NFR BLD AUTO: 24.9 %
MCH RBC QN AUTO: 30.5 PG (ref 26–34)
MCHC RBC AUTO-ENTMCNC: 33.8 G/DL (ref 31–37)
MCV RBC AUTO: 90.3 FL
MONOCYTES # BLD AUTO: 0.36 X10(3) UL (ref 0.1–1)
MONOCYTES NFR BLD AUTO: 5.2 %
NEUTROPHILS # BLD AUTO: 4.62 X10 (3) UL (ref 1.5–7.7)
NEUTROPHILS # BLD AUTO: 4.62 X10(3) UL (ref 1.5–7.7)
NEUTROPHILS NFR BLD AUTO: 66.9 %
NITRITE UR QL STRIP.AUTO: NEGATIVE
OSMOLALITY SERPL CALC.SUM OF ELEC: 295 MOSM/KG (ref 275–295)
PH UR: 6.5 [PH] (ref 5–8)
PLATELET # BLD AUTO: 191 10(3)UL (ref 150–450)
POTASSIUM SERPL-SCNC: 3.4 MMOL/L (ref 3.5–5.1)
PROT SERPL-MCNC: 6.3 G/DL (ref 5.7–8.2)
PROT UR-MCNC: 20 MG/DL
RBC # BLD AUTO: 4.23 X10(6)UL
SODIUM SERPL-SCNC: 142 MMOL/L (ref 136–145)
SP GR UR STRIP: >1.03 (ref 1–1.03)
UROBILINOGEN UR STRIP-ACNC: 2
WBC # BLD AUTO: 6.9 X10(3) UL (ref 4–11)

## 2024-05-12 PROCEDURE — 99284 EMERGENCY DEPT VISIT MOD MDM: CPT

## 2024-05-12 PROCEDURE — 80053 COMPREHEN METABOLIC PANEL: CPT | Performed by: NURSE PRACTITIONER

## 2024-05-12 PROCEDURE — 83690 ASSAY OF LIPASE: CPT | Performed by: NURSE PRACTITIONER

## 2024-05-12 PROCEDURE — 96360 HYDRATION IV INFUSION INIT: CPT

## 2024-05-12 PROCEDURE — 81025 URINE PREGNANCY TEST: CPT

## 2024-05-12 PROCEDURE — 96361 HYDRATE IV INFUSION ADD-ON: CPT

## 2024-05-12 PROCEDURE — 85025 COMPLETE CBC W/AUTO DIFF WBC: CPT | Performed by: NURSE PRACTITIONER

## 2024-05-12 PROCEDURE — 81001 URINALYSIS AUTO W/SCOPE: CPT | Performed by: NURSE PRACTITIONER

## 2024-05-12 PROCEDURE — 76705 ECHO EXAM OF ABDOMEN: CPT | Performed by: NURSE PRACTITIONER

## 2024-05-12 RX ORDER — PANTOPRAZOLE SODIUM 20 MG/1
20 TABLET, DELAYED RELEASE ORAL DAILY
Qty: 30 TABLET | Refills: 0 | Status: SHIPPED | OUTPATIENT
Start: 2024-05-12 | End: 2024-05-14 | Stop reason: DRUGHIGH

## 2024-05-13 ENCOUNTER — TELEPHONE (OUTPATIENT)
Dept: INTERNAL MEDICINE CLINIC | Facility: CLINIC | Age: 24
End: 2024-05-13

## 2024-05-13 NOTE — ED QUICK NOTES
Pt states \"I believe I may be having a gallbladder attack. I had very bad pain in my stomach but it like magically went away as soon as you guys brought me back here\". Pt reports \"passing a stone years ago\". In no apparent distress.

## 2024-05-13 NOTE — TELEPHONE ENCOUNTER
Patient refusing to go back to the ED today. She states that she is having back pain and stomach pain since last night. She is afraid of being fired from the job. She will go and see her GI doctor tomorrow.

## 2024-05-13 NOTE — ED PROVIDER NOTES
Patient Seen in: [facility] Emergency Department      History     Chief Complaint   Patient presents with    Abdomen/Flank Pain     Stated Complaint: Abdominal pain    Subjective:   24yo/f with a past medical history of bipolar affective disorder, PTSD presenting today for evaluation of epigastric pain. Patient reports she has had \"years of gallbladder attacks\" and this one has been going on for 4 days. Reports similar episode in march with no sonographic evidence of biliary colic. Has not seen gi. No vomiting. Nausea. No head, neck, back, chest, flank pain. No urinary symptoms.             Objective:   Past Medical History:    Bipolar affective (HCC)    History of substance abuse (HCC)    PTSD (post-traumatic stress disorder)              History reviewed. No pertinent surgical history.             Social History     Socioeconomic History    Marital status: Single   Tobacco Use    Smoking status: Former     Types: Cigarettes     Start date: 10/21/2012    Smokeless tobacco: Never    Tobacco comments:     Vape    Vaping Use    Vaping status: Every Day   Substance and Sexual Activity    Alcohol use: No     Alcohol/week: 0.0 standard drinks of alcohol    Drug use: Yes     Types: Cannabis    Sexual activity: Never   Other Topics Concern    Blood Transfusions No    Caffeine Concern No    Occupational Exposure No    Hobby Hazards No    Sleep Concern No    Stress Concern No    Weight Concern No    Exercise Yes    Bike Helmet Yes    Seat Belt Yes    Self-Exams Yes              Review of Systems   All other systems reviewed and are negative.      Positive for stated complaint: Abdominal pain  Other systems are as noted in HPI.  Constitutional and vital signs reviewed.      All other systems reviewed and negative except as noted above.    Physical Exam     ED Triage Vitals [05/12/24 2108]   /55   Pulse 59   Resp 24   Temp 97.3 °F (36.3 °C)   Temp src Temporal   SpO2 99 %   O2 Device None (Room air)        Current Vitals:   Vital Signs  BP: 115/55  Pulse: 59  Resp: 24  Temp: 97.3 °F (36.3 °C)  Temp src: Temporal    Oxygen Therapy  SpO2: 99 %  O2 Device: None (Room air)            Physical Exam  Vitals and nursing note reviewed.   Constitutional:       General: She is not in acute distress.     Appearance: She is well-developed.   HENT:      Head: Normocephalic and atraumatic.      Nose: Nose normal.      Mouth/Throat:      Mouth: Mucous membranes are moist.   Eyes:      Conjunctiva/sclera: Conjunctivae normal.      Pupils: Pupils are equal, round, and reactive to light.   Cardiovascular:      Rate and Rhythm: Normal rate and regular rhythm.      Heart sounds: Normal heart sounds.   Pulmonary:      Effort: Pulmonary effort is normal.      Breath sounds: Normal breath sounds.   Abdominal:      General: Bowel sounds are normal.      Palpations: Abdomen is soft.   Musculoskeletal:         General: No tenderness or deformity. Normal range of motion.      Cervical back: Normal range of motion and neck supple.   Skin:     General: Skin is warm and dry.      Capillary Refill: Capillary refill takes less than 2 seconds.      Findings: No rash.      Comments: Normal color   Neurological:      General: No focal deficit present.      Mental Status: She is alert and oriented to person, place, and time.      GCS: GCS eye subscore is 4. GCS verbal subscore is 5. GCS motor subscore is 6.      Cranial Nerves: No cranial nerve deficit.      Gait: Gait normal.               ED Course     Labs Reviewed   COMP METABOLIC PANEL (14) - Abnormal; Notable for the following components:       Result Value    Potassium 3.4 (*)     Globulin  1.7 (*)     A/G Ratio 2.7 (*)     All other components within normal limits   URINALYSIS, ROUTINE - Abnormal; Notable for the following components:    Spec Gravity >1.030 (*)     Ketones Urine Trace (*)     Protein Urine 20 (*)     Urobilinogen Urine 2 (*)     All other components within normal limits    LIPASE - Normal   POCT PREGNANCY URINE - Normal   CBC WITH DIFFERENTIAL WITH PLATELET    Narrative:     The following orders were created for panel order CBC With Differential With Platelet.  Procedure                               Abnormality         Status                     ---------                               -----------         ------                     CBC W/ DIFFERENTIAL[[id number]]                              Final result                 Please view results for these tests on the individual orders.   CBC W/ DIFFERENTIAL          US gallbladder      IMPRESSION:  No priors.    No gall bladder wall thickening.  No gallstones identified.  Sonographic Pete sign is negative.  Common bile duct within normal limits at 4 mm in diameter.  Visualized liver, pancreas, and right kidney appear normal.         MDM                         Medical Decision Making  24yo/f w hx and exam as stated; RUQ pain    Nml lfts and lipase  Us non acute  No vomiting  Tolerating po  No weakness  No flank pain  No urinary symptoms  Overall stable    Plan  Dc to home  Close fu      Amount and/or Complexity of Data Reviewed  Labs:  Decision-making details documented in ED Course.  Radiology:  Decision-making details documented in ED Course.    Risk  OTC drugs.  Prescription drug management.        Disposition and Plan     Clinical Impression:  1. RUQ pain         Disposition:  Discharge  5/12/2024 11:41 pm    Follow-up:  Diego Herndon MD  [address] [place] [id number] [phone]    Follow up in 2 day(s)            Medications Prescribed:  Current Discharge Medication List        START taking these medications    Details   pantoprazole 20 MG Oral Tab EC Take 1 tablet (20 mg total) by mouth daily.  Qty: 30 tablet, Refills: 0

## 2024-05-13 NOTE — TELEPHONE ENCOUNTER
Pt is calling stating that went to er yesterday for abdominal pain, but today vomiting and saw some blood in there. Pt also mention that is having lower back pain that is not able to stand completely straight. Pt would like to know what to do since she just went to er yesterday and had appointment schedule to see dr. Penny on 05/15 and specialist tomorrow.         Please call and advise

## 2024-05-13 NOTE — ED INITIAL ASSESSMENT (HPI)
Pt presents to ed with c/o abdominal pain. Pt states the pain is mid epigastric and radiates up to her chest. Pt states she has been having constipation the last few days. Pt reports burning with urination.    Pt tearful in triage. No meds pta.

## 2024-05-14 ENCOUNTER — TELEPHONE (OUTPATIENT)
Facility: CLINIC | Age: 24
End: 2024-05-14

## 2024-05-14 ENCOUNTER — OFFICE VISIT (OUTPATIENT)
Facility: CLINIC | Age: 24
End: 2024-05-14

## 2024-05-14 VITALS
SYSTOLIC BLOOD PRESSURE: 141 MMHG | BODY MASS INDEX: 27.6 KG/M2 | HEART RATE: 85 BPM | WEIGHT: 150 LBS | HEIGHT: 62 IN | DIASTOLIC BLOOD PRESSURE: 87 MMHG

## 2024-05-14 DIAGNOSIS — R13.12 OROPHARYNGEAL DYSPHAGIA: ICD-10-CM

## 2024-05-14 DIAGNOSIS — R11.2 NAUSEA AND VOMITING, UNSPECIFIED VOMITING TYPE: Primary | ICD-10-CM

## 2024-05-14 DIAGNOSIS — R10.10 UPPER ABDOMINAL PAIN: Primary | ICD-10-CM

## 2024-05-14 DIAGNOSIS — R11.2 NAUSEA AND VOMITING, UNSPECIFIED VOMITING TYPE: ICD-10-CM

## 2024-05-14 DIAGNOSIS — R19.4 CHANGE IN STOOL HABITS: ICD-10-CM

## 2024-05-14 DIAGNOSIS — R13.10 DYSPHAGIA, UNSPECIFIED TYPE: ICD-10-CM

## 2024-05-14 DIAGNOSIS — R10.10 PAIN OF UPPER ABDOMEN: ICD-10-CM

## 2024-05-14 PROCEDURE — 99244 OFF/OP CNSLTJ NEW/EST MOD 40: CPT

## 2024-05-14 PROCEDURE — 3077F SYST BP >= 140 MM HG: CPT

## 2024-05-14 PROCEDURE — 3079F DIAST BP 80-89 MM HG: CPT

## 2024-05-14 PROCEDURE — 3008F BODY MASS INDEX DOCD: CPT

## 2024-05-14 RX ORDER — PANTOPRAZOLE SODIUM 40 MG/1
40 TABLET, DELAYED RELEASE ORAL
Qty: 180 TABLET | Refills: 0 | Status: SHIPPED | OUTPATIENT
Start: 2024-05-14 | End: 2024-08-12

## 2024-05-14 RX ORDER — ONDANSETRON 4 MG/1
4 TABLET, ORALLY DISINTEGRATING ORAL EVERY 8 HOURS PRN
Qty: 30 TABLET | Refills: 0 | Status: SHIPPED | OUTPATIENT
Start: 2024-05-14

## 2024-05-14 RX ORDER — DICYCLOMINE HCL 20 MG
20 TABLET ORAL
Qty: 120 TABLET | Refills: 2 | Status: SHIPPED | OUTPATIENT
Start: 2024-05-14 | End: 2024-08-12

## 2024-05-14 NOTE — TELEPHONE ENCOUNTER
Scheduled for:  EGD 23130  Provider Name:  Dr Smith  Date:  05/29/2024  Location:  [facility]   Sedation:  MAC  Time: 9:15am (Patient is aware arrival time is at 8:15am)  Prep:  NPO after Midnight Prep Instructions Given At The Office Visit.    Meds/Allergies Reconciled?:  Physician Reviewed  Diagnosis with codes:  upper abdominal pain R13.10, N/V R11.2, occasional dysphagia R13.10  Was patient informed to call insurance with codes (Y/N):  Yes  Referral sent?:  Referral was sent at the time of electronic surgical scheduling.  [facility] or [facility] notified?:  I sent an electronic request to Endo Scheduling and received a confirmation today.  Medication Orders:  Pt is aware to NOT take iron pills, herbal meds and diet supplements for 7 days before exam. Also to NOT take any form of alcohol, recreational drugs and any forms of ED meds 24 hours before exam.   Misc Orders:  Patient was informed that they will need a COVID 19 test prior to their procedure. Patient verbally understood & will await a phone call from [facility] to schedule.       Further instructions given by staff:  I provide prep instructions to patient at the time of the appointment and reviewed date, time and location, she verbalized that she understood and is aware to call if she has any questions.    Patient was informed about the new cancellation policy for his/her procedure. Patient was also given a copy of the cancellation policy at the time of the appointment and verbalized understanding.

## 2024-05-14 NOTE — H&P
[facility]                                                                                                               Reason for consult: upper abd pain    Requesting physician or provider: Alex Penny MD    Chief Complaint   Patient presents with    Follow - Up     ER follow up       HPI:   Dariana Henderson is a 23 year old year-old female with active diagnoses including bipolar affective disorder, PTSD, anorexia nervosa, ketamine abuse. Prior medical/surgical history in table below.    she is here today for ED follow up. She arrived to clinic very tearful and irritated as she thought today was her EGD procedure. She responded well when reassured I would listen to her and try to help her and asked to calm down and not cuss.   #ABD PAIN  #N/V  #occasional dysphagia  #variable stool habits  -ED visit 3/15 - gastritis & fatigue and 5/12- RUQ abd pain. Dcd with famotidine 1st visit and pantoprazole 2nd visit. Had unremarkable US gallbladder at both visits. CBC without leukocytosis or anemia either visit. LFTs & lipase WNL.  -pt reports she is in recovery from anorexia nervosa. When anorexia was severe 3-4 years ago she began having intermittent upper abd pain. Associated with frequent nausea and occasional vomiting.  Worse the past 2 months. Pain usually comes in spurts of 3 days and happens weekly. Occasional dysphagia at bottom of neck. Worse with greasy food and soda. Pain as slightly better after famotidine & pantoprazole from ED visit. The patient reports using ketamine recreationally almost daily and pain is worse after ketamine and with dairy and greasy food. She vapes nicotine daily and marijuana use occasionally. She also reports chronic fatigue for few years. Variable stool habits from constipation to diarrhea, mostly constipation.  -diet is variable day to day due to hx of anorexia and  current worsening abd pain & nausea. Normally does not eat 3 meals daily but does report trying to eat healthy proteins, carbs, veggies, fruits. Mostly drinks water. Has one coffee and one red bull daily.    Patient denies symptoms of odynophagia, globus sensation, heartburn, hematemesis, hematochezia, or melena. she denies recent fever    Last colonoscopy: none   Last EGD: none     NSAIDS: none   Tobacco: daily vaping  Alcohol: rare  Marijuana: occasional  Illicit drugs: ketamine daily, previous polysubstance abuse    FH GI malignancy: none   FH IBD: none     No history of adverse reaction to sedation  No BLAKE  No anticoagulants/antiplatelet  No pacemaker/defibrillator    Wt Readings from Last 6 Encounters:   05/14/24 150 lb (68 kg)   05/12/24 150 lb (68 kg)   03/18/24 161 lb (73 kg)   03/15/24 154 lb (69.9 kg)   01/30/23 135 lb (61.2 kg)   09/08/22 129 lb (58.5 kg)        History, Medications, Allergies, ROS:      Past Medical History:    Bipolar affective (HCC)    History of substance abuse (HCC)    PTSD (post-traumatic stress disorder)      History reviewed. No pertinent surgical history.   Family Hx: History reviewed. No pertinent family history.   Social History:   Social History     Socioeconomic History    Marital status: Single   Tobacco Use    Smoking status: Former     Types: Cigarettes     Start date: 10/21/2012    Smokeless tobacco: Never    Tobacco comments:     Vape    Vaping Use    Vaping status: Every Day   Substance and Sexual Activity    Alcohol use: No     Alcohol/week: 0.0 standard drinks of alcohol    Drug use: Yes     Types: Cannabis    Sexual activity: Never   Other Topics Concern    Blood Transfusions No    Caffeine Concern No    Occupational Exposure No    Hobby Hazards No    Sleep Concern No    Stress Concern No    Weight Concern No    Exercise Yes    Bike Helmet Yes    Seat Belt Yes    Self-Exams Yes        Medications (Active prior to today's visit):  Current Outpatient Medications    Medication Sig Dispense Refill    pantoprazole 40 MG Oral Tab EC Take 1 tablet (40 mg total) by mouth 2 (two) times daily before meals. 180 tablet 0    ondansetron 4 MG Oral Tablet Dispersible Take 1 tablet (4 mg total) by mouth every 8 (eight) hours as needed. 30 tablet 0    dicyclomine 20 MG Oral Tab Take 1 tablet (20 mg total) by mouth 4 (four) times daily before meals and nightly. 120 tablet 2    sennosides (SENOKOT) 8.6 MG Oral Tab Take 1 tablet (8.6 mg total) by mouth daily as needed. 30 tablet 1    ALPRAZolam 0.25 MG Oral Tab Take 0.25 mg by mouth Q12H. (Patient not taking: Reported on 3/18/2024)         Allergies:  No Known Allergies    ROS:   CONSTITUTIONAL: negative for fevers, chills, sweats  EYES Negative for scleral icterus or redness, and diplopia  HEENT: Negative for hoarseness  RESPIRATORY: Negative for cough and severe shortness of breath  CARDIOVASCULAR: Negative for crushing sub-sternal chest pain  GASTROINTESTINAL: See HPI  GENITOURINARY: Negative for dysuria, +urinary incontinence  MUSCULOSKELETAL: Negative for arthralgias and myalgias  SKIN: Negative for jaundice, rash or pruritus  NEUROLOGICAL: Negative for dizziness and headaches  BEHAVIOR/PSYCH: +anxiety    PHYSICAL EXAM:   Blood pressure 141/87, pulse 85, height 5' 2\" (1.575 m), weight 150 lb (68 kg), last menstrual period 05/01/2024.    GEN: Alert, well-nourished, mild distress related to pain  HEENT: anicteric sclera, neck supple, trachea midline, MMM, no palpable or tender neck or supraclavicular lymph nodes  CV: RRR, the extremities are warm and well perfused   LUNGS: No increased work of breathing, CTAB  ABDOMEN: +tender with guarding across upper abdomen. Soft, symmetrical, without distention. No scars or lesions. Aorta is without bruit or visible pulsation. Umbilicus is midline without herniation. Normoactive bowel sounds are present, No masses, hepatomegaly or splenomegaly noted.  MSK: No erythema, no warmth, no swelling of  joints  SKIN: No jaundice, no erythema, no rashes, no lesions. +scars to arms  HEMATOLOGIC: No bleeding, no bruising  NEURO: Alert and interactive, TORRES  PSYCH: tearful, anxious    Labs/Imaging/Procedures:     Patient's pertinent labs and imaging were reviewed and discussed with patient today.        .  ASSESSMENT/PLAN:   Dariana Henderson is a 23 year old year-old female with active diagnoses including bipolar affective disorder, PTSD, anorexia nervosa, ketamine abuse. Prior medical/surgical history in table below.    she is here today for ED follow up. She arrived to clinic very tearful and irritated as she thought today was her EGD procedure. She responded well when reassured I would listen to her and try to help her and asked to calm down and not cuss.   #ABD PAIN  #N/V  #occasional dysphagia  #variable stool habits  -ED visits x2 3/15 - gastritis & fatigue and 5/12- RUQ abd pain. Dcd with famotidine 1st visit and pantoprazole 2nd visit. Had unremarkable US gallbladder at both visits. CBC without leukocytosis or anemia either visit. LFTs & lipase WNL.  -pt reports she is in recovery from anorexia nervosa. When anorexia was severe 3-4 years ago she began having intermittent upper abd pain. Associated with frequent nausea and occasional vomiting.  Worse the past 2 months. Pain usually comes in spurts of 3 days and happens weekly. Occasional dysphagia at bottom of neck. Worse with greasy food and soda. Pain as slightly better after famotidine & pantoprazole from ED visit. The patient reports using ketamine recreationally almost daily and pain is worse after ketamine and with dairy and greasy food. She vapes nicotine daily and marijuana use occasionally. She also reports chronic fatigue for few years. Variable stool habits from constipation to diarrhea, mostly constipation.  -diet is variable day to day due to hx of anorexia and current worsening abd pain & nausea. Normally does not eat 3 meals daily but does report  trying to eat healthy proteins, carbs, veggies, fruits. Mostly drinks water. Has one coffee and one red bull daily.    -etiology likely multifactorial. Likely current problem gastritis given severe pain & epigastric tenderness. Irregular eating habits, caffeine use, tobacco, marijuana and ketamine use likely exacerbating symptoms. EGD to assess for gastritis, esophagitis, PUD, H. Pylori, less likely malignancy no anemia. Irregular stool habits likely secondary to above. May have component of functional dyspepsia, visceral hypersensitivity and/or IBS-mixed at baseline. Patient left appointment feeling more calm, agreeable to plan, and appreciative of care.     Recommendations:  -Schedule upper endoscopy (EGD) with 1st available at hospital [Diagnosis: upper abdominal pain, N/V, occasional dysphagia]  *MAC - recreational ketamine user    ENDOSCOPIC RISK BENEFIT DISCUSSION: I described the procedure in great detail with the patient. I discussed the risks and benefits, including but not limited to: bleeding, perforation, infection, anesthesia complications, and even death. Patient will be NPO after midnight and will have a person physically present at time of pick-up to drive patient home. Patient verbalized understanding and agrees to proceed with procedure as planned.    -Pantoprazole 40mg twice daily. Take on empty stomach     -dicyclomine (bentyl) 20mg up to 4x daily. Start once daily. And can titrate up to 4x daily.     -ondansetron (zofran) as needed for nausea    -follow up with me in 2 months       Future: work on stool changes next visit      Orders This Visit:  No orders of the defined types were placed in this encounter.      Meds This Visit:  Requested Prescriptions     Signed Prescriptions Disp Refills    pantoprazole 40 MG Oral Tab  tablet 0     Sig: Take 1 tablet (40 mg total) by mouth 2 (two) times daily before meals.    ondansetron 4 MG Oral Tablet Dispersible 30 tablet 0     Sig: Take 1 tablet (4  mg total) by mouth every 8 (eight) hours as needed.    dicyclomine 20 MG Oral Tab 120 tablet 2     Sig: Take 1 tablet (20 mg total) by mouth 4 (four) times daily before meals and nightly.       Imaging & Referrals:  None      SHANTEL Stone    [facility] Gastroenterology  5/14/2024        This note was partially prepared using Dragon Medical voice recognition dictation software. As a result, errors may occur. When identified, these errors have been corrected. While every attempt is made to correct errors during dictation, discrepancies may still exist.

## 2024-05-14 NOTE — PATIENT INSTRUCTIONS
1. Schedule upper endoscopy (EGD) with 1st available at hospital [Diagnosis: upper abdominal pain, N/V, occasional dysphagia]    2. If you start any NEW medication after your visit today, please notify us. Certain medications will need to be held before the procedure, or the procedure cannot be performed safely.     3. DO NOT TAKE: Iron (ferrous sulfate/ ferrous gluconate) pills, herbal supplements, multivitamins, or diet medications (i.e. Phentermine/Vyvanse) for 7 days before exam.  DO NOT TAKE: Any form of alcohol, recreational drugs and any forms of Erectile Dysfunction medications 24 hours prior to procedure.    4. The day BEFORE your procedure, NOTHING TO EAT OR DRINK AFTER MIDNIGHT! If your procedure is scheduled in the afternoon, you may have clear liquids only up to 3 hours before the time of your procedure. If you fail to keep your stomach empty for 3 hours prior to procedure time, your procedure may be CANCELLED. Instructions can also be found at: www.[facility].org/giprep         -Pantoprazole 40mg twice daily. Take on empty stomach     -dicyclomine (bentyl) 20mg up to 4x daily. Start once daily. And can titrate up to 4x daily.     -ondansetron (zofran) as needed for nausea    -follow up with me in 2 months

## 2024-05-15 NOTE — TELEPHONE ENCOUNTER
Called utilization management at [phone], spoke with Christos, no PA needed, reference number [id number]. Referral updated.

## 2024-05-18 ENCOUNTER — HOSPITAL ENCOUNTER (EMERGENCY)
Facility: HOSPITAL | Age: 24
Discharge: HOME | End: 2024-05-18
Attending: EMERGENCY MEDICINE

## 2024-05-18 ENCOUNTER — OFFICE VISIT (OUTPATIENT)
Dept: FAMILY MEDICINE CLINIC | Facility: CLINIC | Age: 24
End: 2024-05-18

## 2024-05-18 ENCOUNTER — APPOINTMENT (OUTPATIENT)
Dept: CT IMAGING | Facility: HOSPITAL | Age: 24
End: 2024-05-18
Attending: EMERGENCY MEDICINE

## 2024-05-18 VITALS
SYSTOLIC BLOOD PRESSURE: 114 MMHG | WEIGHT: 150 LBS | TEMPERATURE: 98 F | RESPIRATION RATE: 16 BRPM | HEART RATE: 87 BPM | HEIGHT: 63 IN | OXYGEN SATURATION: 98 % | BODY MASS INDEX: 26.58 KG/M2 | DIASTOLIC BLOOD PRESSURE: 60 MMHG

## 2024-05-18 VITALS
RESPIRATION RATE: 17 BRPM | HEIGHT: 63 IN | TEMPERATURE: 98 F | OXYGEN SATURATION: 100 % | DIASTOLIC BLOOD PRESSURE: 65 MMHG | BODY MASS INDEX: 26.58 KG/M2 | HEART RATE: 67 BPM | WEIGHT: 150 LBS | SYSTOLIC BLOOD PRESSURE: 132 MMHG

## 2024-05-18 DIAGNOSIS — R30.0 DYSURIA: ICD-10-CM

## 2024-05-18 DIAGNOSIS — R39.9 LOWER URINARY TRACT SYMPTOMS: Primary | ICD-10-CM

## 2024-05-18 DIAGNOSIS — R10.30 LOWER ABDOMINAL PAIN: Primary | ICD-10-CM

## 2024-05-18 LAB
ALBUMIN SERPL-MCNC: 4.9 G/DL (ref 3.2–4.8)
ALP LIVER SERPL-CCNC: 59 U/L
ALT SERPL-CCNC: 52 U/L
ANION GAP SERPL CALC-SCNC: 9 MMOL/L (ref 0–18)
AST SERPL-CCNC: 24 U/L (ref ?–34)
B-HCG UR QL: NEGATIVE
BASOPHILS # BLD AUTO: 0.03 X10(3) UL (ref 0–0.2)
BASOPHILS NFR BLD AUTO: 0.4 %
BILIRUB DIRECT SERPL-MCNC: 0.2 MG/DL (ref ?–0.3)
BILIRUB SERPL-MCNC: 0.6 MG/DL (ref 0.3–1.2)
BILIRUB UR QL: NEGATIVE
BUN BLD-MCNC: 9 MG/DL (ref 9–23)
BUN/CREAT SERPL: 11 (ref 10–20)
CALCIUM BLD-MCNC: 9.5 MG/DL (ref 8.7–10.4)
CHLORIDE SERPL-SCNC: 109 MMOL/L (ref 98–112)
CLARITY UR: CLEAR
CO2 SERPL-SCNC: 22 MMOL/L (ref 21–32)
COLOR UR: YELLOW
CREAT BLD-MCNC: 0.82 MG/DL
DEPRECATED RDW RBC AUTO: 38.8 FL (ref 35.1–46.3)
EGFRCR SERPLBLD CKD-EPI 2021: 102 ML/MIN/1.73M2 (ref 60–?)
EOSINOPHIL # BLD AUTO: 0.1 X10(3) UL (ref 0–0.7)
EOSINOPHIL NFR BLD AUTO: 1.4 %
ERYTHROCYTE [DISTWIDTH] IN BLOOD BY AUTOMATED COUNT: 12.1 % (ref 11–15)
GLUCOSE (URINE DIPSTICK): NEGATIVE MG/DL
GLUCOSE BLD-MCNC: 85 MG/DL (ref 70–99)
GLUCOSE UR-MCNC: 30 MG/DL
HCT VFR BLD AUTO: 45 %
HGB BLD-MCNC: 15.5 G/DL
IMM GRANULOCYTES # BLD AUTO: 0.01 X10(3) UL (ref 0–1)
IMM GRANULOCYTES NFR BLD: 0.1 %
KETONES (URINE DIPSTICK): NEGATIVE MG/DL
KETONES UR-MCNC: 10 MG/DL
LEUKOCYTE ESTERASE UR QL STRIP.AUTO: NEGATIVE
LEUKOCYTES: NEGATIVE
LIPASE SERPL-CCNC: 45 U/L (ref 13–75)
LYMPHOCYTES # BLD AUTO: 1.73 X10(3) UL (ref 1–4)
LYMPHOCYTES NFR BLD AUTO: 24.3 %
MCH RBC QN AUTO: 30.6 PG (ref 26–34)
MCHC RBC AUTO-ENTMCNC: 34.4 G/DL (ref 31–37)
MCV RBC AUTO: 88.8 FL
MONOCYTES # BLD AUTO: 0.38 X10(3) UL (ref 0.1–1)
MONOCYTES NFR BLD AUTO: 5.3 %
MULTISTIX LOT#: ABNORMAL NUMERIC
NEUTROPHILS # BLD AUTO: 4.86 X10 (3) UL (ref 1.5–7.7)
NEUTROPHILS # BLD AUTO: 4.86 X10(3) UL (ref 1.5–7.7)
NEUTROPHILS NFR BLD AUTO: 68.5 %
NITRITE UR QL STRIP.AUTO: NEGATIVE
NITRITE, URINE: NEGATIVE
OSMOLALITY SERPL CALC.SUM OF ELEC: 288 MOSM/KG (ref 275–295)
PH UR: 6 [PH] (ref 5–8)
PH, URINE: 6 (ref 4.5–8)
PLATELET # BLD AUTO: 200 10(3)UL (ref 150–450)
POTASSIUM SERPL-SCNC: 3.7 MMOL/L (ref 3.5–5.1)
PROT SERPL-MCNC: 7 G/DL (ref 5.7–8.2)
PROT UR-MCNC: 600 MG/DL
PROTEIN (URINE DIPSTICK): >=300 MG/DL
RBC # BLD AUTO: 5.07 X10(6)UL
RBC #/AREA URNS AUTO: >10 /HPF
SODIUM SERPL-SCNC: 140 MMOL/L (ref 136–145)
SP GR UR STRIP: >1.03 (ref 1–1.03)
SPECIFIC GRAVITY: >=1.03 (ref 1–1.03)
URINE-COLOR: YELLOW
UROBILINOGEN UR STRIP-ACNC: NORMAL
UROBILINOGEN,SEMI-QN: 0.2 MG/DL (ref 0–1.9)
WBC # BLD AUTO: 7.1 X10(3) UL (ref 4–11)

## 2024-05-18 PROCEDURE — 87086 URINE CULTURE/COLONY COUNT: CPT | Performed by: NURSE PRACTITIONER

## 2024-05-18 PROCEDURE — 99215 OFFICE O/P EST HI 40 MIN: CPT | Performed by: NURSE PRACTITIONER

## 2024-05-18 PROCEDURE — 81025 URINE PREGNANCY TEST: CPT

## 2024-05-18 PROCEDURE — 83690 ASSAY OF LIPASE: CPT | Performed by: EMERGENCY MEDICINE

## 2024-05-18 PROCEDURE — 81003 URINALYSIS AUTO W/O SCOPE: CPT | Performed by: NURSE PRACTITIONER

## 2024-05-18 PROCEDURE — 80048 BASIC METABOLIC PNL TOTAL CA: CPT | Performed by: EMERGENCY MEDICINE

## 2024-05-18 PROCEDURE — 87491 CHLMYD TRACH DNA AMP PROBE: CPT | Performed by: NURSE PRACTITIONER

## 2024-05-18 PROCEDURE — 85025 COMPLETE CBC W/AUTO DIFF WBC: CPT | Performed by: EMERGENCY MEDICINE

## 2024-05-18 PROCEDURE — 80076 HEPATIC FUNCTION PANEL: CPT | Performed by: EMERGENCY MEDICINE

## 2024-05-18 PROCEDURE — 3074F SYST BP LT 130 MM HG: CPT | Performed by: NURSE PRACTITIONER

## 2024-05-18 PROCEDURE — 3008F BODY MASS INDEX DOCD: CPT | Performed by: NURSE PRACTITIONER

## 2024-05-18 PROCEDURE — 99285 EMERGENCY DEPT VISIT HI MDM: CPT

## 2024-05-18 PROCEDURE — 87591 N.GONORRHOEAE DNA AMP PROB: CPT | Performed by: NURSE PRACTITIONER

## 2024-05-18 PROCEDURE — 74177 CT ABD & PELVIS W/CONTRAST: CPT | Performed by: EMERGENCY MEDICINE

## 2024-05-18 PROCEDURE — 81001 URINALYSIS AUTO W/SCOPE: CPT | Performed by: EMERGENCY MEDICINE

## 2024-05-18 PROCEDURE — 3078F DIAST BP <80 MM HG: CPT | Performed by: NURSE PRACTITIONER

## 2024-05-18 PROCEDURE — 36415 COLL VENOUS BLD VENIPUNCTURE: CPT

## 2024-05-18 NOTE — ED INITIAL ASSESSMENT (HPI)
Pt sent here from Urgent care for further evaluation of RLQ abdominal pain radiating to the back x 1 week,  With N/V.  Pt is A/Ox 4, breathing unlabored.  History of Anorexia , Borderline Bipolar.  Denies SI/HI
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
## 2024-05-18 NOTE — DISCHARGE INSTRUCTIONS
Please follow with urology as an outpatient.  Return to emergency room for fevers of 100.4, worsening pain,   etc. please follow-up with urology as an outpatient   The Emergency Department is not intended to be a substitute for an effort to provide complete medical care. The imaging, if any, have often been interpreted on a preliminary basis pending final reading by the radiologist. If your blood pressure was greater than 140/90, please have this blood pressure rechecked by your primary care provider lakshmi the next few days. You will benefit from a further discussion of lifestyle modifications that include Weight Reduction - Dietary Sodium Restriction - Increased Physical Activity and Moderation in alcohol (ETOH) Consumption. If possible check your pressure at home and keep a blood pressure log to bring to your physician.

## 2024-05-20 LAB
C TRACH DNA SPEC QL NAA+PROBE: NEGATIVE
N GONORRHOEA DNA SPEC QL NAA+PROBE: NEGATIVE

## 2024-05-29 ENCOUNTER — ANESTHESIA EVENT (OUTPATIENT)
Dept: ENDOSCOPY | Facility: HOSPITAL | Age: 24
End: 2024-05-29

## 2024-05-29 ENCOUNTER — HOSPITAL ENCOUNTER (OUTPATIENT)
Facility: HOSPITAL | Age: 24
Setting detail: HOSPITAL OUTPATIENT SURGERY
Discharge: HOME | End: 2024-05-29
Attending: INTERNAL MEDICINE | Admitting: INTERNAL MEDICINE

## 2024-05-29 ENCOUNTER — ANESTHESIA (OUTPATIENT)
Dept: ENDOSCOPY | Facility: HOSPITAL | Age: 24
End: 2024-05-29

## 2024-05-29 VITALS
HEART RATE: 71 BPM | HEIGHT: 63 IN | OXYGEN SATURATION: 99 % | WEIGHT: 149 LBS | BODY MASS INDEX: 26.4 KG/M2 | RESPIRATION RATE: 17 BRPM | DIASTOLIC BLOOD PRESSURE: 68 MMHG | SYSTOLIC BLOOD PRESSURE: 106 MMHG

## 2024-05-29 DIAGNOSIS — R11.2 NAUSEA AND VOMITING, UNSPECIFIED VOMITING TYPE: ICD-10-CM

## 2024-05-29 DIAGNOSIS — R10.10 PAIN OF UPPER ABDOMEN: ICD-10-CM

## 2024-05-29 DIAGNOSIS — R13.10 DYSPHAGIA, UNSPECIFIED TYPE: ICD-10-CM

## 2024-05-29 LAB — B-HCG UR QL: NEGATIVE

## 2024-05-29 PROCEDURE — 0DB18ZX EXCISION OF UPPER ESOPHAGUS, VIA NATURAL OR ARTIFICIAL OPENING ENDOSCOPIC, DIAGNOSTIC: ICD-10-PCS | Performed by: INTERNAL MEDICINE

## 2024-05-29 PROCEDURE — 0DB58ZX EXCISION OF ESOPHAGUS, VIA NATURAL OR ARTIFICIAL OPENING ENDOSCOPIC, DIAGNOSTIC: ICD-10-PCS | Performed by: INTERNAL MEDICINE

## 2024-05-29 PROCEDURE — 43239 EGD BIOPSY SINGLE/MULTIPLE: CPT | Performed by: INTERNAL MEDICINE

## 2024-05-29 PROCEDURE — 0DB38ZX EXCISION OF LOWER ESOPHAGUS, VIA NATURAL OR ARTIFICIAL OPENING ENDOSCOPIC, DIAGNOSTIC: ICD-10-PCS | Performed by: INTERNAL MEDICINE

## 2024-05-29 PROCEDURE — 0DB78ZX EXCISION OF STOMACH, PYLORUS, VIA NATURAL OR ARTIFICIAL OPENING ENDOSCOPIC, DIAGNOSTIC: ICD-10-PCS | Performed by: INTERNAL MEDICINE

## 2024-05-29 RX ORDER — SODIUM CHLORIDE, SODIUM LACTATE, POTASSIUM CHLORIDE, CALCIUM CHLORIDE 600; 310; 30; 20 MG/100ML; MG/100ML; MG/100ML; MG/100ML
INJECTION, SOLUTION INTRAVENOUS CONTINUOUS
Status: DISCONTINUED | OUTPATIENT
Start: 2024-05-29 | End: 2024-05-29

## 2024-05-29 RX ORDER — LIDOCAINE HYDROCHLORIDE 10 MG/ML
INJECTION, SOLUTION EPIDURAL; INFILTRATION; INTRACAUDAL; PERINEURAL AS NEEDED
Status: DISCONTINUED | OUTPATIENT
Start: 2024-05-29 | End: 2024-05-29 | Stop reason: SURG

## 2024-05-29 RX ORDER — NALOXONE HYDROCHLORIDE 0.4 MG/ML
0.08 INJECTION, SOLUTION INTRAMUSCULAR; INTRAVENOUS; SUBCUTANEOUS ONCE AS NEEDED
Status: DISCONTINUED | OUTPATIENT
Start: 2024-05-29 | End: 2024-05-29

## 2024-05-29 RX ADMIN — SODIUM CHLORIDE, SODIUM LACTATE, POTASSIUM CHLORIDE, CALCIUM CHLORIDE: 600; 310; 30; 20 INJECTION, SOLUTION INTRAVENOUS at 09:44:00

## 2024-05-29 RX ADMIN — LIDOCAINE HYDROCHLORIDE 50 MG: 10 INJECTION, SOLUTION EPIDURAL; INFILTRATION; INTRACAUDAL; PERINEURAL at 09:47:00

## 2024-05-29 RX ADMIN — SODIUM CHLORIDE, SODIUM LACTATE, POTASSIUM CHLORIDE, CALCIUM CHLORIDE: 600; 310; 30; 20 INJECTION, SOLUTION INTRAVENOUS at 10:04:00
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
## 2024-05-29 NOTE — DISCHARGE INSTRUCTIONS
Home Care Instructions for Gastroscopy with Sedation    Diet:  - Resume your regular diet as tolerated unless otherwise instructed.  - Start with light meals to minimize bloating.  - Do not drink alcohol today.    Medication:  - If you have questions about resuming your normal medications, please contact your Primary Care Physician.    Activities:  - Take it easy today. Do not return to work today.  - Do not drive today.  - Do not operate any machinery today (including kitchen equipment).    Gastroscopy:  - You may have a sore throat for 2-3 days following the exam. This is normal. Gargling with warm salt water (1/2 tsp salt to 1 glass warm water) or using throat lozenges will help.  - If you experience any sharp pain in your neck, abdomen or chest, vomiting of blood, oral temperature over 100 degrees Fahrenheit, light-headedness or dizziness, or any other problems, contact your doctor.    **If unable to reach your doctor, please go to the [facility] Emergency Room**    - Your referring physician will receive a full report of your examination.  - If you do not hear from your doctor's office within two weeks of your biopsy, please call them for your results.    You may be able to see your laboratory results in [employer] between 4 and 7 business days.  In some cases, your physician may not have viewed the results before they are released to [employer].  If you have questions regarding your results contact the physician who ordered the test/exam by phone or via [employer] by choosing \"Ask a Medical Question.\"

## 2024-05-29 NOTE — ANESTHESIA POSTPROCEDURE EVALUATION
Patient: Dariana Henderson    Procedure Summary       Date: 05/29/24 Room / Location: [facility] ENDOSCOPY 04 / [facility] ENDOSCOPY    Anesthesia Start: 0944 Anesthesia Stop:     Procedure: ESOPHAGOGASTRODUODENOSCOPY with biopsy Diagnosis:       Nausea and vomiting, unspecified vomiting type      Dysphagia, unspecified type      Pain of upper abdomen      (Irregular z-line, small hiatal hernia)    Surgeons: Radha Smith MD Anesthesiologist: Cyndy Mccollum CRNA    Anesthesia Type: general ASA Status: 2            Anesthesia Type: general    Vitals Value Taken Time   BP 98/50 05/29/24 1004   Temp N/a 05/29/24 1005   Pulse 69 05/29/24 1004   Resp 26 05/29/24 1004   SpO2 94 % 05/29/24 1004       [facility] AN Post Evaluation:   Patient Evaluated in PACU  Patient Participation: complete - patient participated  Level of Consciousness: sleepy but conscious  Pain Management: adequate  Airway Patency:patent  Yes    Cardiovascular Status: acceptable  Respiratory Status: acceptable and room air  Postoperative Hydration acceptable      Cyndy Mccollum CRNA  5/29/2024 10:05 AM

## 2024-05-29 NOTE — OPERATIVE REPORT
ESOPHAGOGASTRODUODENOSCOPY (EGD) REPORT    Dariana Henderson     2000 Age 23 year old   PCP Alex Penny MD Endoscopist Radha Smith MD     Date of procedure: 24    Procedure: EGD w/ cold biopsy    Pre-operative diagnosis: esophageal dysphagia, abdominal pain, nausea and vomiting     Post-operative diagnosis: mildly irregular Z-line, hiatal hernia    Medications: MAC    Complications: none    Procedure:  Informed consent was obtained from the patient after the risks of the procedure were discussed, including but not limited to bleeding, perforation, aspiration, infection, or possibility of a missed lesion. After discussions of the risks/benefits and alternatives to this procedure, as well as the planned sedation, the patient was placed in the left lateral decubitus position and begun on continuous blood pressure pulse oximetry and EKG monitoring and this was maintained throughout the procedure. Once an adequate level of sedation was obtained a bite block was placed. Then the lubricated tip of the [id number] diagnostic video upper endoscope was inserted and advanced using direct visualization into the posterior pharynx and ultimately into the esophagus.    Complications: None    Findings:      1. Esophagus: The squamocolumnar junction was noted at 35 cm and appeared mildly irregular and cold forceps biopsies were taken in four quadrants to rule out Palacios's esophagus. If consistent with Palacios's, [place] Classification C0M1. The diaphragmatic pinch was noted noted at 36 cm from the incisors. There was a 1 cm hiatal hernia. The esophageal mucosa appeared otherwise unremarkable and cold forceps biopsies were taken of the proximal and distal esophagus.     2. Stomach: The stomach distended normally. Normal rugal folds were seen. The pylorus was patent. The gastric mucosa appeared normal and cold forceps biopsies were taken of the antrum, incisura, and body. Retroflexion revealed a normal fundus  and a mildly patulous cardia.     3. Duodenum: The duodenal mucosa appeared normal in the 1st and 2nd portion of the duodenum. Cold forceps biopsies were taken of the bulb and descending duodenum.     Impression:  1. Small hiatal hernia.  2. Mildly irregular Z-line. Biopsied.  3. Otherwise unremarkable EGD s/p biopsies of the esophagus, stomach, and duodenum.     Recommend:  1. Await pathology.  2. Follow up with SHANTEL Stone in 1-2 weeks to discuss next steps.   3. Continue pantoprazole 40 mg daily in the meantime.  4. Avoid NSAIDs.     >>>If tissue was sampled/removed and you have not received your pathology results either by phone or letter within 2 weeks, please call our office at [phone].    Specimens: duodenum, stomach, GE-junction, esophagus       Blood loss: <1 ml

## 2024-05-30 ENCOUNTER — TELEPHONE (OUTPATIENT)
Facility: CLINIC | Age: 24
End: 2024-05-30

## 2024-05-30 DIAGNOSIS — K31.A0 INTESTINAL METAPLASIA OF GASTRIC CARDIA: Primary | ICD-10-CM

## 2024-05-30 DIAGNOSIS — K22.70 BARRETT'S ESOPHAGUS WITHOUT DYSPLASIA: ICD-10-CM

## 2024-05-30 RX ORDER — PANTOPRAZOLE SODIUM 40 MG/1
TABLET, DELAYED RELEASE ORAL
Qty: 450 TABLET | Refills: 0 | Status: SHIPPED | OUTPATIENT
Start: 2024-05-30 | End: 2025-05-25

## 2024-05-30 NOTE — TELEPHONE ENCOUNTER
Patient calling back and has additional questions regards forms, please call.    Patient: calling for medication refill.  Medication(s) set up as pending orders from medication list.  Preferred pharmacy set up and verified.    Patient told to check with pharmacy after 48 hours.  Patient was advised they would be notified only if there is a problem concerning the refill.

## 2024-05-30 NOTE — PROGRESS NOTES
Called & spoke with patient regarding EGD findings \"Squamocolumnar mucosa with reflux changes and complete and incomplete intestinal metaplasia. The distal esophageal findings (part C) may represent intestinal metaplasia of the gastric cardia, or Palacios's esophagus.\"    Advised to restart PPI 2x daily for 2 months, then continue once daily chronically. 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 She has a goal to stop nicotine use. She has stopped coffee and energy drinks as well. We have follow up visit in July.

## 2024-05-30 NOTE — TELEPHONE ENCOUNTER
I spoke to the pt and told her to contact the [facility] [place] department [phone]

## 2024-05-30 NOTE — PROGRESS NOTES
Nicolas Joe,  Her path shows short segment Palacios's esophagus. I recommend high dose PPI therapy (dosed with the goal of strict control of any reflux symptoms) and a repeat EGD in 3 years for surveillance.  Thanks! SS

## 2024-06-03 ENCOUNTER — TELEPHONE (OUTPATIENT)
Facility: CLINIC | Age: 24
End: 2024-06-03

## 2024-06-03 NOTE — TELEPHONE ENCOUNTER
Patient called because  she has a hernia and would like to see Tatyana Domínguez much sooner to discuss possible surgery options. States the hernia is affecting her life and has been limiting her on being active. Please advise.

## 2024-06-03 NOTE — TELEPHONE ENCOUNTER
Americans with Disabilities Act forms ([employer]) received at Forms Department on 6/3/2024. No Release of Information on file - sent patient a [employer] message. Logged for processing.

## 2024-06-03 NOTE — TELEPHONE ENCOUNTER
Spoke to patient    She is experiencing worsening pain in her epigastric area. She states is is affecting her daily life. She is unable to workout, stretch, and work without experiencing pain. She also states it hurts to life her arms and she is losing sleep.    Pt would like to schedule a sooner appointment due to the worsening pain  Rescheduled patient    Location, date and time confirmed    Your Appointments      Monday Shikha 10, 2024 10:00 AM  Follow Up Visit with SHANTEL Stone  [facility] ([facility]) [facility] [address] [address]  [phone]

## 2024-06-04 NOTE — TELEPHONE ENCOUNTER
Thank you,   I support her FMLA for up to 1 year but I would modify the wording as we do not think the hiatal hernia is the cause of her worsening pain. Modified text are underlined below.   SHANTEL Stone      Patient is seeking Intermittent and Reduced schedule under Americans with Disabilities Act submitted in response to Dx of intestinal metaplasia, Palacios's Esophagus & Hiatal Hernia, patient is looking to have 1-4 Flare ups a mth lasting 1-2 days each flare/ Onsite accommodation would be no lifting over 20lbs repeatedly, no Bending squatting or heavy lifting, and in the instance flare happens at work to be able to leave early for at home self care to relieve pain from gastrointestinal symptoms.

## 2024-06-04 NOTE — TELEPHONE ENCOUNTER
Cld patient gathered the below to complete requested Americans with Disabilities Act     Type of Leave: Americans with Disabilities Act   Reason for Leave: Hernia- Palacios's Esophagus  Start date of leave: 05/20/2024-05/19/2025  How much time needed?: 1-4 Flare ups a mth lasting 1-2 days each flare/ Onsite accommodation would be no lifting over 20lbs repeatedly, no Bending squatting or heavy lifting, and in the instance flare happens at work to be ble to leave early for at home self care to relieve pain from aggravated hernia.   Forms Due Date: asap   Was Fee and Turnaround info Given?: Yes      Patient aware of next steps as far as tasking DR for above req and timeline associated w/ that. Patient also states she will complete the Release of Information request sent to her via [employer] to enable faxing to [employer] upon it's completion.

## 2024-06-04 NOTE — TELEPHONE ENCOUNTER
Tatyana Domínguez,     Patient is seeking Intermittent and Reduced schedule under Americans with Disabilities Act submitted in response to Dx of Palacios's Esophagus & Hiatal Hernia, patient is looking to have 1-4 Flare ups a mth lasting 1-2 days each flare/ Onsite accommodation would be no lifting over 20lbs repeatedly, no Bending squatting or heavy lifting, and in the instance flare happens at work to be ble to leave early for at home self care to relieve pain from aggravated hernia.  Do you support?     Thank you,   Annette ANDRADE

## 2024-06-06 NOTE — TELEPHONE ENCOUNTER
Please try to avoid signing forms in the corner as it is not visible when printing and forms are not accepted this way. Thank you!     Tatyana Domínguez,        *The ACKNOWLEDGE button has been moved to the top right ribbon*    Please sign off on form if you agree to: Americans with Disabilities Act 05/20/24-05/19/25  (place your signature on the first page only)    -From your Inbasket, Highlight the patient and click Chart   -Double click the 06/03/2024 Forms Completion telephone encounter  -Scroll down to the Media section   -Click the blue Hyperlink: Americans with Disabilities Act Tatyana Domínguez 06/06/2024  -Click Acknowledge located in the top right ribbon/menu   -Drag the mouse into the blank space of the document and a + sign will appear. Left click to   electronically sign the document.     Thank you,  Annette ANDRADE

## 2024-06-10 NOTE — TELEPHONE ENCOUNTER
Americans with Disabilities Act faxed to to [employer] @ [phone] successfully, sending MyChart to adv patient. Archiving forms.

## 2024-06-17 ENCOUNTER — TELEPHONE (OUTPATIENT)
Facility: CLINIC | Age: 24
End: 2024-06-17

## 2024-06-17 NOTE — TELEPHONE ENCOUNTER
Revision requested on recent forms, revised, scanned into this encounter and faxed to [employer] , pending confirmation

## 2024-06-19 NOTE — TELEPHONE ENCOUNTER
Faxing to [employer] keeps failing, uploading to [employer] to make sure patient receives forms, sending MyChart, archiving forms.

## 2024-06-25 ENCOUNTER — OFFICE VISIT (OUTPATIENT)
Dept: OBGYN CLINIC | Facility: CLINIC | Age: 24
End: 2024-06-25

## 2024-06-25 VITALS
WEIGHT: 159 LBS | SYSTOLIC BLOOD PRESSURE: 95 MMHG | HEIGHT: 63 IN | DIASTOLIC BLOOD PRESSURE: 59 MMHG | HEART RATE: 60 BPM | BODY MASS INDEX: 28.17 KG/M2

## 2024-06-25 DIAGNOSIS — Z01.419 ENCOUNTER FOR WELL WOMAN EXAM WITH ROUTINE GYNECOLOGICAL EXAM: Primary | ICD-10-CM

## 2024-06-25 PROCEDURE — 3008F BODY MASS INDEX DOCD: CPT | Performed by: STUDENT IN AN ORGANIZED HEALTH CARE EDUCATION/TRAINING PROGRAM

## 2024-06-25 PROCEDURE — 3078F DIAST BP <80 MM HG: CPT | Performed by: STUDENT IN AN ORGANIZED HEALTH CARE EDUCATION/TRAINING PROGRAM

## 2024-06-25 PROCEDURE — 99385 PREV VISIT NEW AGE 18-39: CPT | Performed by: STUDENT IN AN ORGANIZED HEALTH CARE EDUCATION/TRAINING PROGRAM

## 2024-06-25 PROCEDURE — 3074F SYST BP LT 130 MM HG: CPT | Performed by: STUDENT IN AN ORGANIZED HEALTH CARE EDUCATION/TRAINING PROGRAM
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
## 2024-06-26 LAB — HPV E6+E7 MRNA CVX QL NAA+PROBE: NEGATIVE

## 2024-09-03 ENCOUNTER — TELEPHONE (OUTPATIENT)
Facility: CLINIC | Age: 24
End: 2024-09-03

## 2024-09-03 NOTE — TELEPHONE ENCOUNTER
Patient calling states has been having issues with hernia again. Stats affecting her at work and feels shortness of breath while working and hurts to do the movements she does as well. States very uncomfortable. Please call and advise, scheduled for next opening and added to wait list for 10/30/24.

## 2024-09-04 NOTE — TELEPHONE ENCOUNTER
Nicolas PATTERSON    Called and spoke to the patient, date of birth and name verified.    She acknowledged she received the voicemail yesterday but was unable to call back.    She reported she has been experiencing constant burning on her upper abdomen that radiates to the left abdomen and feels tight. At present, pain level is 5/10 exacerbated with twisting and bending which she does a lot at work. She feels this is making her short of breath.    She will take TUMS which helps, she stopped taking pantoprazole. Advised to resume due to Barrette's esophagus.    States she is careful with her diet avoiding food triggers, she stopped smoking and does not drink alcohol.    She is requesting to see you in the office, has appointment on 10/30/2024    Advised the patient to go to the ER for evaluation of shortness of breath, she declines due to insurance reasons. Reiterated to resume taking pantoprazole and ER evaluation for shortness of breath and if experiencing worsening abdominal pain.    The patient verbalized understanding.    Thank you      Your Appointments      Thursday September 12, 2024 10:00 AM  Follow Up Visit with SHANTEL Stone  [facility] ([facility]) [address]  [facility] [id number] [phone]                 None

## 2024-09-05 NOTE — TELEPHONE ENCOUNTER
Agree with RN recc to resume PPI. She should have active refills from prior prescription.     SHANTEL Stone

## 2024-10-30 ENCOUNTER — TELEPHONE (OUTPATIENT)
Dept: INTERNAL MEDICINE CLINIC | Facility: CLINIC | Age: 24
End: 2024-10-30

## 2024-10-30 ENCOUNTER — NURSE TRIAGE (OUTPATIENT)
Dept: INTERNAL MEDICINE CLINIC | Facility: CLINIC | Age: 24
End: 2024-10-30

## 2024-10-30 NOTE — TELEPHONE ENCOUNTER
[facility] sent to Dariana for Dr. Penny's recommendations for pain control.

## 2024-10-30 NOTE — TELEPHONE ENCOUNTER
Spoke to Dariana hilary right write and hand 2-3 years with last month swelling and tightness for 2 weeks. Patient has burning sensation up to 10/10 but it is 5/10. Patient has reduction of her grasp with mild swelling of base of wrist.     Disposition: Seen in office today.     Consultation with Dr. Zohaib Manzo scheduled 11/6/24 at 3:20 PM. Patient informed of Dr. Penny's commendations to wear carpal tunnel brace. Dr. Penny advised for pain relieve Tylenol and trial of Diclofenac gel on the wrist.          Reason for Disposition   Weakness (i.e., loss of strength) of new-onset in hand or finger   (Exception: Not truly weak, hand feels weak because of pain.)    Answer Assessment - Initial Assessment Questions  1. ONSET: \"When did the pain start?\"      2-3 years but worsening 2 weeks  2. LOCATION: \"Where is the pain located?\"      Right hand and wrist  3. PAIN: \"How bad is the pain?\" (Scale 1-10; or mild, moderate, severe)    - MILD (1-3): doesn't interfere with normal activities    - MODERATE (4-7): interferes with normal activities (e.g., work or school) or awakens from sleep    - SEVERE (8-10): excruciating pain, unable to use hand at all      10/10 last night but today is 5-6/10.  4. WORK OR EXERCISE: \"Has there been any recent work or exercise that involved this part (i.e., hand or wrist) of the body?\"      Yes, hula hoop use (professional)  5. CAUSE: \"What do you think is causing the pain?\"      Working with hands  6. AGGRAVATING FACTORS: \"What makes the pain worse?\" (e.g., using computer)      Hula hoop   7. OTHER SYMPTOMS: \"Do you have any other symptoms?\" (e.g., neck pain, swelling, rash, numbness, fever)      Reduction   8. PREGNANCY: \"Is there any chance you are pregnant?\" \"When was your last menstrual period?\"      Denied: LMP: current    Protocols used: Hand and Wrist Pain-A-[place]

## 2024-10-30 NOTE — TELEPHONE ENCOUNTER
Pt called stating that she has been having Carpal Tunnel symptoms, a lot of burning sensation on right palm and wrist, numbness, tingling, swelling, pain  Pt would like to be seen soon    Please advise

## 2024-11-05 ENCOUNTER — PATIENT MESSAGE (OUTPATIENT)
Dept: INTERNAL MEDICINE CLINIC | Facility: CLINIC | Age: 24
End: 2024-11-05

## 2024-11-06 ENCOUNTER — OFFICE VISIT (OUTPATIENT)
Dept: INTERNAL MEDICINE CLINIC | Facility: CLINIC | Age: 24
End: 2024-11-06
Payer: COMMERCIAL

## 2024-11-06 ENCOUNTER — LAB ENCOUNTER (OUTPATIENT)
Dept: LAB | Facility: HOSPITAL | Age: 24
End: 2024-11-06
Attending: INTERNAL MEDICINE
Payer: COMMERCIAL

## 2024-11-06 VITALS
HEART RATE: 104 BPM | OXYGEN SATURATION: 98 % | HEIGHT: 63 IN | DIASTOLIC BLOOD PRESSURE: 52 MMHG | WEIGHT: 154 LBS | SYSTOLIC BLOOD PRESSURE: 104 MMHG | BODY MASS INDEX: 27.29 KG/M2

## 2024-11-06 DIAGNOSIS — M25.50 ARTHRALGIA, UNSPECIFIED JOINT: ICD-10-CM

## 2024-11-06 DIAGNOSIS — M25.50 ARTHRALGIA, UNSPECIFIED JOINT: Primary | ICD-10-CM

## 2024-11-06 LAB
CRP SERPL-MCNC: <0.4 MG/DL (ref ?–1)
ERYTHROCYTE [SEDIMENTATION RATE] IN BLOOD: 4 MM/HR
RHEUMATOID FACT SERPL-ACNC: <3.5 IU/ML (ref ?–14)
URATE SERPL-MCNC: 4.9 MG/DL

## 2024-11-06 PROCEDURE — 99214 OFFICE O/P EST MOD 30 MIN: CPT | Performed by: INTERNAL MEDICINE

## 2024-11-06 PROCEDURE — 84550 ASSAY OF BLOOD/URIC ACID: CPT

## 2024-11-06 PROCEDURE — 86200 CCP ANTIBODY: CPT

## 2024-11-06 PROCEDURE — 36415 COLL VENOUS BLD VENIPUNCTURE: CPT

## 2024-11-06 PROCEDURE — 86225 DNA ANTIBODY NATIVE: CPT

## 2024-11-06 PROCEDURE — 86431 RHEUMATOID FACTOR QUANT: CPT

## 2024-11-06 PROCEDURE — 86038 ANTINUCLEAR ANTIBODIES: CPT

## 2024-11-06 PROCEDURE — 85652 RBC SED RATE AUTOMATED: CPT

## 2024-11-06 PROCEDURE — 86140 C-REACTIVE PROTEIN: CPT

## 2024-11-08 LAB
CCP IGG SERPL-ACNC: <0.4 U/ML (ref 0–6.9)
DSDNA IGG SERPL IA-ACNC: <0.6 IU/ML
ENA AB SER QL IA: <0.09 UG/L
ENA AB SER QL IA: NEGATIVE
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
## 2024-11-13 NOTE — TELEPHONE ENCOUNTER
Responded to pt's Flipsw[employer] message re: desire to speak to PCP abt lab results and new condition she believes she may have (fibromyalgia) since she had 11/6 exam.    Informed pt her message as been routed to PCP although she may need a F/U appt. Await PCPs response.

## 2024-12-11 ENCOUNTER — PATIENT MESSAGE (OUTPATIENT)
Dept: INTERNAL MEDICINE CLINIC | Facility: CLINIC | Age: 24
End: 2024-12-11

## 2024-12-19 NOTE — TELEPHONE ENCOUNTER
Left a message on Dariana's voice mail to call the office.       Advise Dariana to schedule an appointment to further discuss her request.

## 2024-12-20 NOTE — TELEPHONE ENCOUNTER
Spoke with Dariana who stated she currently does not have health insurance. Patient started a new job and will be getting her health insurance  February. Patient cannot afford Occupational therapy. Patient had taken a couple of doses of her friend's Gabapentin and found it was effective for hier wrist pain.    RN informed Dariana Penny advised Gabapentin is not appropriate treatment for her health condition. Dariana is requesting Dr. Penny prescribe a different medication if he does not prescribe Gabapentin.     Please advise.

## 2024-12-23 NOTE — TELEPHONE ENCOUNTER
Based on last conversation discussed  this is likely musculoskeletal.  She is unable to tolerate oral NSAIDs.  Alternate between tylenol and diclofenac gel.  Use of brace (thumb spicca splint) recommended.  Gabapentin is not indicated for her type of discomfort.  There are side effect risks to taking medications when they are not prescribed and also when taking them for off label purposes.      Dr. Penny

## 2025-01-02 ENCOUNTER — PATIENT MESSAGE (OUTPATIENT)
Facility: CLINIC | Age: 25
End: 2025-01-02

## 2025-01-02 NOTE — TELEPHONE ENCOUNTER
Dariana VILLA [facility] (supporting SHANTEL Stone)1 hour ago (6:17 AM)       Good Morning Tatyana & Happy New Year! I apologize for missing appointments I had scheduled with you, I was having so many issues with my past job, accommodations, etc. 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 I would like to very slowly and safely start waist training, do you have any suggestions or recommendations on how I can do this without aggravating my hernia?

## 2025-02-19 ENCOUNTER — PATIENT MESSAGE (OUTPATIENT)
Facility: CLINIC | Age: 25
End: 2025-02-19

## 2025-02-20 NOTE — TELEPHONE ENCOUNTER
Dariana VILLA  [facility] Clinical Staff (supporting SHANTEL Stone)22 hours ago (1:36 PM)       Good afternoon Tatyana,      I’m reaching out because I am requesting an accommodations for an extra bathroom break 1-2 times a day if needed for up to ten extra minutes. I started a new job I work from home so it’s important for me to have all of my time counted for and sometimes I find myself needing to use the bathroom more frequently or for longer periods.

## 2025-03-04 NOTE — TELEPHONE ENCOUNTER
Faxed attached form to the Forms Department with fax confirmation received at 6:19 pm.    The patient was notified via [employer].    raj Joe.

## 2025-03-13 ENCOUNTER — TELEPHONE (OUTPATIENT)
Facility: CLINIC | Age: 25
End: 2025-03-13

## 2025-03-13 NOTE — TELEPHONE ENCOUNTER
Americans with Disabilities Act received in forms dept, logged for expedited processing. Advised stat rep.

## 2025-03-17 ENCOUNTER — TELEPHONE (OUTPATIENT)
Facility: CLINIC | Age: 25
End: 2025-03-17

## 2025-03-17 ENCOUNTER — HOSPITAL ENCOUNTER (OUTPATIENT)
Age: 25
Discharge: HOME | End: 2025-03-17
Payer: COMMERCIAL

## 2025-03-17 ENCOUNTER — APPOINTMENT (OUTPATIENT)
Dept: GENERAL RADIOLOGY | Age: 25
End: 2025-03-17
Attending: PHYSICIAN ASSISTANT
Payer: COMMERCIAL

## 2025-03-17 ENCOUNTER — PATIENT MESSAGE (OUTPATIENT)
Facility: CLINIC | Age: 25
End: 2025-03-17

## 2025-03-17 VITALS
DIASTOLIC BLOOD PRESSURE: 57 MMHG | HEART RATE: 74 BPM | RESPIRATION RATE: 18 BRPM | TEMPERATURE: 98 F | OXYGEN SATURATION: 100 % | SYSTOLIC BLOOD PRESSURE: 100 MMHG

## 2025-03-17 DIAGNOSIS — S39.91XA ABDOMINAL TRAUMA, INITIAL ENCOUNTER: ICD-10-CM

## 2025-03-17 DIAGNOSIS — S63.602A SPRAIN OF LEFT THUMB, UNSPECIFIED SITE OF DIGIT, INITIAL ENCOUNTER: Primary | ICD-10-CM

## 2025-03-17 PROCEDURE — 73130 X-RAY EXAM OF HAND: CPT | Performed by: PHYSICIAN ASSISTANT

## 2025-03-17 PROCEDURE — 99213 OFFICE O/P EST LOW 20 MIN: CPT | Performed by: PHYSICIAN ASSISTANT

## 2025-03-17 PROCEDURE — L3924 HFO WITHOUT JOINTS PRE OTS: HCPCS | Performed by: PHYSICIAN ASSISTANT

## 2025-03-17 NOTE — ED INITIAL ASSESSMENT (HPI)
Pt states she fell on Saturday while roller skating.  Pt has pain in her left thumb after fall and abd pain.  Pt states she has an appointment for hernia this Wednesday.  Pt needs a work note.

## 2025-03-17 NOTE — TELEPHONE ENCOUNTER
Called and spoke to the patient, date of birth and name verified.    She reported she had fallen over the weekend while roller skating. When asked about the fall, she stated her hands hit the ground first then her body.    Since then she feels discomfort on her chest, abdomen and her \"hiatal hernia\" when she leans forward, sit or crunches. She not does not feel any discomfort when laying or standing.    The patient is concerned that her hiatal hernia is affected.    Offered office visit and advised if the pain becomes severe she should go to the ER or urgent for evaluation.    The patient verbalized understanding.    Your Appointments      Wednesday March 19, 2025 10:00 AM  Follow Up Visit with SHANTEL Stone  [facility] ([facility] [address]  [place] [address]  [phone]

## 2025-03-17 NOTE — TELEPHONE ENCOUNTER
Patient sent patient message below but also called.  Patient is thinking of going to ER. Please call.       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 I’ve been resting. I haven’t been feeling to weird up until this morning. My hernia just feels like it’s throbbing. I don’t feel like it’s a medical emergency but I also don’t see many openings and I am starting work so I found it easier to send this in for now. I haven’t slight discoloration on my stomach, soreness in my chest, hernia, and my left side. I didn’t notice all of this at first because my quadricep was on fire and I started my period first thing that night when I got home. Unsure if I should come in or wait it out.

## 2025-03-18 NOTE — TELEPHONE ENCOUNTER
Tatyana,    Please sign off on form if you agree to: Americans with Disabilities Act to accommodate frequent, unpredictable bathroom breaks throughout the workday due to Intestinal metaplasia and hiatal hernia    -Signature page will be the first page scanned  -From your Inbasket, Highlight the patient and click Chart   -Double click the 3/13/25 Forms Completion telephone encounter  -Scroll down to the Media section   -Click the blue Hyperlink: Americans with Disabilities Act, Tatyana Sang, 3/18/25  -Click Acknowledge located in the top right ribbon/menu   -Drag the mouse into the blank space of the document and a + sign will appear. Left click to   electronically sign the document.  -Once signed, simply exit out of the screen and you signature will be saved.     Thank you,  Celeste TAI

## 2025-03-18 NOTE — TELEPHONE ENCOUNTER
Patient called requesting status of forms. Obtained details below for Americans with Disabilities Act. Patient neede

## 2025-03-19 ENCOUNTER — TELEPHONE (OUTPATIENT)
Facility: CLINIC | Age: 25
End: 2025-03-19

## 2025-03-19 ENCOUNTER — OFFICE VISIT (OUTPATIENT)
Facility: CLINIC | Age: 25
End: 2025-03-19

## 2025-03-19 VITALS
HEIGHT: 63 IN | BODY MASS INDEX: 24.63 KG/M2 | DIASTOLIC BLOOD PRESSURE: 66 MMHG | WEIGHT: 139 LBS | HEART RATE: 87 BPM | SYSTOLIC BLOOD PRESSURE: 112 MMHG

## 2025-03-19 DIAGNOSIS — R11.0 NAUSEA: ICD-10-CM

## 2025-03-19 DIAGNOSIS — K22.70 BARRETT'S ESOPHAGUS WITHOUT DYSPLASIA: ICD-10-CM

## 2025-03-19 DIAGNOSIS — R79.89 ELEVATED LFTS: ICD-10-CM

## 2025-03-19 DIAGNOSIS — R19.8 IRREGULAR BOWEL HABITS: ICD-10-CM

## 2025-03-19 DIAGNOSIS — R10.10 UPPER ABDOMINAL PAIN: Primary | ICD-10-CM

## 2025-03-19 DIAGNOSIS — Z87.19 HISTORY OF BARRETT'S ESOPHAGUS: Primary | ICD-10-CM

## 2025-03-19 NOTE — TELEPHONE ENCOUNTER
Schedulers- Patient was seen in office today by Tatyana Domínguez, please call patient to schedule procedure per providers orders below. I reviewed and handed a copy of prep instructions with patient in office as well as medications. Patient is aware of different locations and our providers possibly booking out. No further questions asked.      1. Schedule upper endoscopy (EGD) with Dr. Smith [Diagnosis: hx of barretts esophagus]   (due around May/June 2025)     Medication Changes: N/A      2. If you start any NEW medication after your visit today, please notify us. Certain medications will need to be held before the procedure, or the procedure cannot be performed safely.      3. DO NOT TAKE: Iron (ferrous sulfate/ ferrous gluconate) pills, herbal supplements, multivitamins, or diet medications (i.e. Phentermine/Vyvanse) for 7 days before exam.  DO NOT TAKE: Any form of alcohol, recreational drugs and any forms of Erectile Dysfunction medications 24 hours prior to procedure.     4. The day BEFORE your procedure, NOTHING TO EAT OR DRINK AFTER MIDNIGHT! If your procedure is scheduled in the afternoon, you may have clear liquids only up to 3 hours before the time of your procedure. If you fail to keep your stomach empty for 3 hours prior to procedure time, your procedure may be CANCELLED. Instructions can also be found at: [email or web address].[email or web address]

## 2025-03-19 NOTE — TELEPHONE ENCOUNTER
Forms completed and uploaded to [facility] mychart. Due to no Release of Information on file for Candelario.

## 2025-03-19 NOTE — TELEPHONE ENCOUNTER
Tatyana,    I have updated forms per your request and scanned in chart. Please let me know if anything further is needed. Thank you for your time.    Please sign off on form if you agree to: Accommodation to take frequent, unpredicted bathroom breaks throughout the workday.    -Signature page will be the first page scanned  -From your Inbasket, Highlight the patient and click Chart   -Double click the 3/13/25 Forms Completion telephone encounter  -Scroll down to the Media section   -Click the blue Hyperlink: Tatyana DEL VALLE, 3/19/25  -Click Acknowledge located in the top right ribbon/menu   -Drag the mouse into the blank space of the document and a + sign will appear. Left click to   electronically sign the document.  -Once signed, simply exit out of the screen and you signature will be saved.     Thank you,  Celeste TAI

## 2025-03-19 NOTE — TELEPHONE ENCOUNTER
Hi,     I agree with accomodation but would like to make the following updates if possible.   On page 2 there is a 1, 2 & 3:   Approve  Update please: \"due to patients complications with irritable bowel syndrome like symptoms, pt needs to take frequent, unpredicted bathroom breaks throughout the workday\"  Update please: \"due to patients complications with irritable bowel syndrome like symptoms, pt needs to take frequent, unpredicted bathroom breaks throughout the workday\"    This update is to reflect that her irregular bowel habits are not related to the hiatal hernia. Thank you    SHANTEL Stone
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
## 2025-03-19 NOTE — PATIENT INSTRUCTIONS
-could try fiber supplement, usually benefiber or citrucel is better for gas/bloating    -okay to try probiotic daily     -working with dietician could be helpful    -for gas okay to try simethicone (gas-x) or bean-o as needed    -follow up 1 month after EGD  -------------------------------------------------------------------------------------------------      1. Schedule upper endoscopy (EGD) with Dr. Smith [Diagnosis: hx of barretts esophagus]   (due around May/June 2025)    Medication Changes: N/A     2. If you start any NEW medication after your visit today, please notify us. Certain medications will need to be held before the procedure, or the procedure cannot be performed safely.     3. DO NOT TAKE: Iron (ferrous sulfate/ ferrous gluconate) pills, herbal supplements, multivitamins, or diet medications (i.e. Phentermine/Vyvanse) for 7 days before exam.  DO NOT TAKE: Any form of alcohol, recreational drugs and any forms of Erectile Dysfunction medications 24 hours prior to procedure.    4. The day BEFORE your procedure, NOTHING TO EAT OR DRINK AFTER MIDNIGHT! If your procedure is scheduled in the afternoon, you may have clear liquids only up to 3 hours before the time of your procedure. If you fail to keep your stomach empty for 3 hours prior to procedure time, your procedure may be CANCELLED. Instructions can also be found at: [email or web address]

## 2025-03-20 NOTE — TELEPHONE ENCOUNTER
Scheduled for: Esophagogastroduodenoscopy [address]    Provider Name:  Dr Smith    Date:  6/23/2025    Location:    [facility]    Sedation:  MAC    Time:  2:30 pm (Patient made aware [facility] will call the day before with procedure/arrival time)    Prep:  NPO    Meds/Allergies Reconciled?:  Physician reviewed     Diagnosis with codes:   History of Palacios's esophagus [Z87.19]      Was patient informed to call insurance with codes (Y/N):  Yes, I confirmed [employer] insurance with the patient.     Referral sent?:  N/A    [facility] or [facility] notified?:  I sent an electronic request to Endo Scheduling and received a confirmation today.      Medication Orders:  This patient verbally confirmed that she is not taking:    Iron, blood thinners, BP meds, and is not diabetic    No latex allergy, No PCN allergy and does not have a pacemaker     Misc Orders:       Further instructions given by staff:   I discussed the prep instructions with the patient which she verbally understood and is aware that I will send the instructions today via [employer].    Advised patient:    You will not be able to drive, operate machinery or make critical decisions the day of your procedure. Please make arrangements for transportation. You must have a  (age 18 or older) to accompany you, stay in the facility for the duration of your procedure and drive you home after the procedure.  You cannot use public transportation (Uber, Lyft, Taxi). The procedure involves sedation, and you will not be allowed to leave unaccompanied. Your procedure will not proceed forward if you're unable to confirm your  planned to escort you home.    Advised Patient:    [facility] requires payment of copay and any patient responsibility at the time of registration.   The [facility] requires copay and 50% of the patient responsibility at the time of service for all Esophagogastroduodenoscopy and diagnostic Colonoscopies.     They do offer payment plans and Care Credit options if unable to pay the full  amount at the time of registration.     If you have any questions regarding your potential responsibility, please contact [facility] Insurance Department at [phone] option 1.    You may receive 4 bills related to your medical procedure:   [facility] (the facility)  The procedural physician  The anesthesiologist  The pathology lab (if applicable)

## 2025-05-16 ENCOUNTER — TELEPHONE (OUTPATIENT)
Facility: CLINIC | Age: 25
End: 2025-05-16

## 2025-05-16 NOTE — TELEPHONE ENCOUNTER
1st,Overdue reminder letter sent out via My chart for the following:  Hepatic Function Panel (7) (Order #[id number]) on 3/19/25

## (undated) DEVICE — GIJAW SINGLE-USE BIOPSY FORCEPS WITH NEEDLE: Brand: GIJAW

## (undated) DEVICE — Device: Brand: DUAL NARE NASAL CANNULAE FEMALE LUER CON 7FT O2 TUBE

## (undated) DEVICE — MEDI-VAC NON-CONDUCTIVE SUCTION TUBING: Brand: CARDINAL HEALTH

## (undated) DEVICE — 60 ML SYRINGE REGULAR TIP: Brand: MONOJECT

## (undated) DEVICE — KIT ENDO ORCAPOD 160/180/190

## (undated) DEVICE — MEDI-VAC NON-CONDUCTIVE SUCTION TUBING 6MM X 1.8M (6FT.) L: Brand: CARDINAL HEALTH

## (undated) DEVICE — YANKAUER,BULB TIP,W/O VENT,RIGID,STERILE: Brand: MEDLINE

## (undated) DEVICE — KIT CLEAN ENDOKIT 1.1OZ GOWNX2

## (undated) DEVICE — CONMED SCOPE SAVER BITE BLOCK, 20X27 MM: Brand: SCOPE SAVER

## (undated) NOTE — ED AVS SNAPSHOT
Gagan Rachel   MRN: [id number]    Department:  [facility] Emergency Department   Date of Visit:  1/30/2018           Disclosure     Insurance plans vary and the physician(s) referred by the ER may not be covered by your plan.  Please contact you CARE PHYSICIAN AT ONCE OR RETURN IMMEDIATELY TO THE EMERGENCY DEPARTMENT. If you have been prescribed any medication(s), please fill your prescription right away and begin taking the medication(s) as directed.   If you believe that any of the medications

## (undated) NOTE — MR AVS SNAPSHOT
[address] [facility] [address] [id number] [phone]               Thank you for choosing us for your health care visit with Luly Aceves MD.  We are glad to serve you and happy to provide you with Tasha Hermosillo, [facility]  [address] [facility] [place], [place]    It is the patient's responsibility to check with and follow their insurance company's guidelines for prior authorization for this test.  You may be held resp [employer] will allow you to access patient instructions from your recent visit,  view other health information, and more. To sign up or find more information, go to [email or web address] and click on the Sign Up Now link in the [employer] box.      Enter

## (undated) NOTE — LETTER
3/19/2025          To Whom It May Concern:    Dariana Henderson is currently under my medical care. Please excuse from work 3/18/25 and 3/19/25 for medical care. Can return to work on 3/20/25.   In the process of reviewing [facility] paperwork.     If you require additional information please contact our office.        Sincerely,    SHANTEL Stone          Document generated by:  SHANTEL Stone

## (undated) NOTE — LETTER
5/14/2024          To Whom It May Concern:    Dariana Henderson is currently under my medical care.    Please excuse Dariana 5/14/24-5/15/24 for medical treatment. She may return to work 5/16/24 without restriction.  If you require additional information please contact our office.        Sincerely,    SHANTEL Stone          Document generated by:  SHANTEL Stone

## (undated) NOTE — LETTER
[facility]  [address], [place], [place]    Authorization for Surgical Operation and Procedure                               I hereby authorize Radha Smith MD, my physician and his/her assistants (if applicable), which may include medical students, residents, and/or fellows, to perform the following surgical operation/ procedure and administer such anesthesia as may be determined necessary by my physician: Operation/Procedure name (s) ESOPHAGOGASTRODUODENOSCOPY on Dariana Henderson   2.   I recognize that during the surgical operation/procedure, unforeseen conditions may necessitate additional or different procedures than those listed above.  I, therefore, further authorize and request that the above-named surgeon, assistants, or designees perform such procedures as are, in their judgment, necessary and desirable.    3.   My surgeon/physician has discussed prior to my surgery the potential benefits, risks and side effects of this procedure; the likelihood of achieving goals; and potential problems that might occur during recuperation.  They also discussed reasonable alternatives to the procedure, including risks, benefits, and side effects related to the alternatives and risks related to not receiving this procedure.  I have had all my questions answered and I acknowledge that no guarantee has been made as to the result that may be obtained.    4.   Should the need arise during my operation/procedure, which includes change of level of care prior to discharge, I also consent to the administration of blood and/or blood products.  Further, I understand that despite careful testing and screening of blood or blood products by collecting agencies, I may still be subject to ill effects as a result of receiving a blood transfusion and/or blood products.  The following are some, but not all, of the potential risks that can occur: fever and allergic reactions, hemolytic reactions, transmission of  diseases such as Hepatitis, AIDS and Cytomegalovirus (CMV) and fluid overload.  In the event that I wish to have an autologous transfusion of my own blood, or a directed donor transfusion, I will discuss this with my physician.  Check only if Refusing Blood or Blood Products  I understand refusal of blood or blood products as deemed necessary by my physician may have serious consequences to my condition to include possible death. I hereby assume responsibility for my refusal and release the hospital, its personnel, and my physicians from any responsibility for the consequences of my refusal.    o  Refuse   5.   I authorize the use of any specimen, organs, tissues, body parts or foreign objects that may be removed from my body during the operation/procedure for diagnosis, research or teaching purposes and their subsequent disposal by hospital authorities.  I also authorize the release of specimen test results and/or written reports to my treating physician on the hospital medical staff or other referring or consulting physicians involved in my care, at the discretion of the Pathologist or my treating physician.    6.   I consent to the photographing or videotaping of the operations or procedures to be performed, including appropriate portions of my body for medical, scientific, or educational purposes, provided my identity is not revealed by the pictures or by descriptive texts accompanying them.  If the procedure has been photographed/videotaped, the surgeon will obtain the original picture, image, videotape or CD.  The hospital will not be responsible for storage, release or maintenance of the picture, image, tape or CD.    7.   I consent to the presence of a  or observers in the operating room as deemed necessary by my physician or their designees.    8.   I recognize that in the event my procedure results in extended X-Ray/fluoroscopy time, I may develop a skin reaction.    9. If I have a Do Not  Attempt Resuscitation (DNAR) order in place, that status will be suspended while in the operating room, procedural suite, and during the recovery period unless otherwise explicitly stated by me (or a person authorized to consent on my behalf). The surgeon or my attending physician will determine when the applicable recovery period ends for purposes of reinstating the DNAR order.  10. Patients having a sterilization procedure: I understand that if the procedure is successful the results will be permanent and it will therefore be impossible for me to inseminate, conceive, or bear children.  I also understand that the procedure is intended to result in sterility, although the result has not been guaranteed.   11. I acknowledge that my physician has explained sedation/analgesia administration to me including the risk and benefits I consent to the administration of sedation/analgesia as may be necessary or desirable in the judgment of my physician.    I CERTIFY THAT I HAVE READ AND FULLY UNDERSTAND THE ABOVE CONSENT TO OPERATION and/or OTHER PROCEDURE.     ____________________________________  _________________________________        ______________________________  Signature of Patient    Signature of Responsible Person                Printed Name of Responsible Person                                      ____________________________________  _____________________________                ________________________________  Signature of Witness        Date  Time         Relationship to Patient    STATEMENT OF PHYSICIAN My signature below affirms that prior to the time of the procedure; I have explained to the patient and/or his/her legal representative, the risks and benefits involved in the proposed treatment and any reasonable alternative to the proposed treatment. I have also explained the risks and benefits involved in refusal of the proposed treatment and alternatives to the proposed treatment and have answered the  patient's questions. If I have a significant financial interest in a co-management agreement or a significant financial interest in any product or implant, or other significant relationship used in this procedure/surgery, I have disclosed this and had a discussion with my patient.     _____________________________________________________              _____________________________  (Signature of Physician)                                                                                         (Date)                                   (Time)  Patient Name: Dariana Henderson      : 2000      Printed: 2024     Medical Record #: [id number]                                      Page 1 of 1

## (undated) NOTE — MR AVS SNAPSHOT
[address] [facility] [address] [facility] [address] [address]  [phone]               Thank you for choosing us for your health care visit with Heide Barnes MD.  We are glad to serve you and happy to provide you with Enter your PictureMenu Activation Code exactly as it appears below along with your Zip Code and Date of Birth to complete the sign-up process. If you do not sign up before the expiration date, you must request a new code.     Your unique PictureMenu Access Code: [email or web address] Visit [facility] online at  [email or web address]

## (undated) NOTE — LETTER
[facility]  Administration of Anesthesia  IDariana agree to be cared for by a physician anesthesiologist alone and/or with a nurse anesthetist, who is specially trained to monitor me and give me medicine to put me to sleep or keep me comfortable during my procedure    I understand that my anesthesiologist and/or anesthetist is not an employee or agent of [facility] or [employer]. He or she works for [facility]    As the patient asking for anesthesia services, I agree to:  Allow the anesthesiologist (anesthesia doctor) to give me medicine and do additional procedures as necessary. Some examples are: Starting or using an “IV” to give me medicine, fluids or blood during my procedure, and having a breathing tube placed to help me breathe when I’m asleep (intubation). In the event that my heart stops working properly, I understand that my anesthesiologist will make every effort to sustain my life, unless otherwise directed by [facility] Do Not Resuscitate documents.  Tell my anesthesia doctor before my procedure:  If I am pregnant.  The last time that I ate or drank.  iii. All of the medicines I take (including prescriptions, herbal supplements, and pills I can buy without a prescription (including street drugs/illegal medications). Failure to inform my anesthesiologist about these medicines may increase my risk of anesthetic complications.  iv.If I am allergic to anything or have had a reaction to anesthesia before.  I understand how the anesthesia medicine will help me (benefits).  I understand that with any type of anesthesia medicine there are risks:  The most common risks are: nausea, vomiting, sore throat, muscle soreness, damage to my eyes, mouth, or teeth (from breathing tube placement).  Rare risks include: remembering what happened during my procedure, allergic reactions to medications, injury to my airway, heart, lungs, vision, nerves, or  muscles and in extremely rare instances death.  My doctor has explained to me other choices available to me for my care (alternatives).  Pregnant Patients (“epidural”):  I understand that the risks of having an epidural (medicine given into my back to help control pain during labor), include itching, low blood pressure, difficulty urinating, headache or slowing of the baby’s heart. Very rare risks include infection, bleeding, seizure, irregular heart rhythms and nerve injury.  Regional Anesthesia (“spinal”, “epidural”, & “nerve blocks”):  I understand that rare but potential complications include headache, bleeding, infection, seizure, irregular heart rhythms, and nerve injury.    _____________________________________________________________________________  Patient (or Representative) Signature/Relationship to Patient  Date   Time    _____________________________________________________________________________   Name (if used)    Language/Organization   Time    _____________________________________________________________________________  Nurse Anesthetist Signature     Date   Time  _____________________________________________________________________________  Anesthesiologist Signature     Date   Time  I have discussed the procedure and information above with the patient (or patient’s representative) and answered their questions. The patient or their representative has agreed to have anesthesia services.    _____________________________________________________________________________  Witness        Date   Time  I have verified that the signature is that of the patient or patient’s representative, and that it was signed before the procedure  Patient Name: Dariana Henderson     : 2000                 Printed: 2024 at 4:24 PM    Medical Record #: [id number]                                            Page 1 of 1  ----------ANESTHESIA CONSENT----------

## (undated) NOTE — LETTER
5/16/2025          Dariana Henderson    [address] [facility] [address]         Dear Dariana,    Our records indicate that the tests ordered for you by SHANTEL Stone  have not been done.  If you have, in fact, already completed the tests or you do not wish to have the tests done, please contact our office at THE NUMBER LISTED BELOW.  Otherwise, please proceed with the testing.  Enclosed is a duplicate order for your convenience.    Labs Order:  Hepatic Function Panel (7) (Order #[id number]) on 3/19/25         Sincerely,    SHANTEL Stone  [facility]  [address]  [place] [address]  [phone]

## (undated) NOTE — LETTER
Date & Time: 3/17/2025, 12:28 PM  Patient: Dariana Henderson  Encounter Provider(s):    Dian Gonzalez PA       To Whom It May Concern:    Dariana Henderson was seen and treated in our department on 3/17/2025. She may return to work on 3/18/2025.    If you have any questions or concerns, please do not hesitate to call.        _____________________________  Physician/APC Signature

## (undated) NOTE — LETTER
Date & Time: 5/29/2024, 9:03 AM  Patient: Dariana Henderson  Encounter Provider(s):    Radha Smith MD       To Whom It May Concern:    Dariana Henderson was seen and treated in our department on 5/29/2024. Patient received anesthesia. She should not return to work until tomorrow 05/30/2024 .    If you have any questions or concerns, please do not hesitate to call.        _____________________________  Physician/APC Signature

## (undated) NOTE — MR AVS SNAPSHOT
After Visit Summary   6/25/2024    Dariana Henderson   MRN: [id number]           Visit Information     Date & Time  6/25/2024 12:15 PM Provider  Dian Smith MD [facility] - OB/GYN Dept. Phone  [phone]      Your Vitals Were  Most recent update: 6/25/2024 12:14 PM    BP   95/59          Pulse   60          Ht   63\"          Wt   159 lb          LMP   05/01/2024 (Approximate)             BMI   28.17 kg/m²         Allergies as of 6/25/2024  Review status set to Review Complete on 6/25/2024   No Known Allergies     Your Current Medications        Dosage    pantoprazole 40 MG Oral Tab EC Take 1 tablet (40 mg total) by mouth 2 (two) times a day for 90 days, THEN 1 tablet (40 mg total) daily.    ondansetron 4 MG Oral Tablet Dispersible Take 1 tablet (4 mg total) by mouth every 8 (eight) hours as needed.    dicyclomine 20 MG Oral Tab Take 1 tablet (20 mg total) by mouth 4 (four) times daily before meals and nightly.    sennosides (SENOKOT) 8.6 MG Oral Tab Take 1 tablet (8.6 mg total) by mouth daily as needed.    ALPRAZolam 0.25 MG Oral Tab Take 0.25 mg by mouth Q12H.      Diagnoses for This Visit    Encounter for well woman exam with routine gynecological exam   [9082164]  -  Primary           We Ordered the Following     Normal Orders This Visit    Hpv Dna  High Risk , Thin Prep Collect [XEI5099 CUSTOM]     THINPREP PAP SMEAR ONLY [RVL2736 CUSTOM]     ThinPrep PAP Smear [[id number] CUSTOM]     Future Labs/Procedures Expected by Expires    Hpv Dna  High Risk , Thin Prep Collect [[id number] CUSTOM]  6/25/2024 6/25/2025    ThinPrep PAP Smear [[id number] CUSTOM]  6/25/2024 6/25/2025                Did you know that [facility] primary care physicians now offer Video Visits through [employer] for adult patients for a variety of conditions such as allergies, back pain and cold symptoms? Skip the drive and waiting room and online chat with a doctor face-to-face using your  web-cam enabled computer or mobile device wherever you are. Video Visits cost $50 and can be paid hassle-free using a credit, debit, or health savings card.  Not active on [employer]? Ask us how to get signed up today!          If you receive a survey from [employer], please take a few minutes to complete it and provide feedback. We strive to deliver the best patient experience and are looking for ways to make improvements. Your feedback will help us do so. For more information on [employer], please visit [email or web address]           No text in SmartText           No text in SmartText

## (undated) NOTE — LETTER
04/22/20        [address]  [facility] [address]      Dear Amando Glover records indicate that you have outstanding lab work and or testing that was ordered for you and has not yet been completed:     To provide you with the

## (undated) NOTE — LETTER
[address] [place] [address]  Dept: [phone]  Dept Fax: [phone]  Loc: [phone]         December 11, 2018    Patient: Harrison Fields   YOB: 2000   Date of Visit: 12/11/2018